# Patient Record
Sex: MALE | Race: WHITE | Employment: FULL TIME | URBAN - METROPOLITAN AREA
[De-identification: names, ages, dates, MRNs, and addresses within clinical notes are randomized per-mention and may not be internally consistent; named-entity substitution may affect disease eponyms.]

---

## 2020-03-09 ENCOUNTER — APPOINTMENT (OUTPATIENT)
Dept: GENERAL RADIOLOGY | Age: 50
End: 2020-03-09

## 2020-03-09 ENCOUNTER — HOSPITAL ENCOUNTER (EMERGENCY)
Age: 50
Discharge: HOME | End: 2020-03-09

## 2020-03-09 VITALS
DIASTOLIC BLOOD PRESSURE: 94 MMHG | HEIGHT: 72 IN | SYSTOLIC BLOOD PRESSURE: 148 MMHG | WEIGHT: 211 LBS | OXYGEN SATURATION: 96 % | TEMPERATURE: 98 F | HEART RATE: 78 BPM | RESPIRATION RATE: 16 BRPM | BODY MASS INDEX: 28.58 KG/M2

## 2020-03-09 PROCEDURE — 6360000002 HC RX W HCPCS: Performed by: NURSE PRACTITIONER

## 2020-03-09 PROCEDURE — 6370000000 HC RX 637 (ALT 250 FOR IP): Performed by: NURSE PRACTITIONER

## 2020-03-09 PROCEDURE — 96372 THER/PROPH/DIAG INJ SC/IM: CPT

## 2020-03-09 PROCEDURE — 73110 X-RAY EXAM OF WRIST: CPT

## 2020-03-09 PROCEDURE — 99283 EMERGENCY DEPT VISIT LOW MDM: CPT

## 2020-03-09 RX ORDER — HYDROCODONE BITARTRATE AND ACETAMINOPHEN 5; 325 MG/1; MG/1
1 TABLET ORAL ONCE
Status: COMPLETED | OUTPATIENT
Start: 2020-03-09 | End: 2020-03-09

## 2020-03-09 RX ORDER — HYDROCODONE BITARTRATE AND ACETAMINOPHEN 5; 325 MG/1; MG/1
1 TABLET ORAL EVERY 8 HOURS PRN
Qty: 9 TABLET | Refills: 0 | Status: SHIPPED | OUTPATIENT
Start: 2020-03-09 | End: 2020-03-12

## 2020-03-09 RX ORDER — KETOROLAC TROMETHAMINE 30 MG/ML
30 INJECTION, SOLUTION INTRAMUSCULAR; INTRAVENOUS ONCE
Status: COMPLETED | OUTPATIENT
Start: 2020-03-09 | End: 2020-03-09

## 2020-03-09 RX ORDER — IBUPROFEN 800 MG/1
800 TABLET ORAL EVERY 8 HOURS PRN
Qty: 15 TABLET | Refills: 0 | Status: SHIPPED | OUTPATIENT
Start: 2020-03-09 | End: 2020-03-14

## 2020-03-09 RX ADMIN — KETOROLAC TROMETHAMINE 30 MG: 30 INJECTION, SOLUTION INTRAMUSCULAR; INTRAVENOUS at 11:55

## 2020-03-09 RX ADMIN — HYDROCODONE BITARTRATE AND ACETAMINOPHEN 1 TABLET: 5; 325 TABLET ORAL at 11:57

## 2020-03-09 ASSESSMENT — PAIN DESCRIPTION - PAIN TYPE: TYPE: ACUTE PAIN

## 2020-03-09 ASSESSMENT — PAIN DESCRIPTION - LOCATION: LOCATION: WRIST

## 2020-03-09 ASSESSMENT — PAIN DESCRIPTION - ONSET: ONSET: SUDDEN

## 2020-03-09 ASSESSMENT — PAIN DESCRIPTION - FREQUENCY: FREQUENCY: CONTINUOUS

## 2020-03-09 ASSESSMENT — PAIN SCALES - GENERAL
PAINLEVEL_OUTOF10: 8

## 2020-03-09 ASSESSMENT — PAIN DESCRIPTION - ORIENTATION: ORIENTATION: RIGHT

## 2020-03-09 ASSESSMENT — PAIN DESCRIPTION - PROGRESSION: CLINICAL_PROGRESSION: GRADUALLY WORSENING

## 2020-03-09 ASSESSMENT — PAIN DESCRIPTION - DESCRIPTORS: DESCRIPTORS: CONSTANT;CRAMPING

## 2020-03-09 NOTE — ED PROVIDER NOTES
[facility]       Department of Emergency Medicine   ED  Provider Note  Admit Date/RoomTime: 3/9/2020 10:10 AM  ED Room: 14/14  Chief Complaint   Wrist Pain (right wrist, pain goes into the hand also. No known injury. Ibuprofen- took a total of 800mg around 0500.)    History of Present Illness   Source of history provided by:  patient. History/Exam Limitations: none. Thomas Green is a 54 y.o. old male who has a past medical history of:   Past Medical History:   Diagnosis Date    Chronic back pain     Hyperlipidemia     Hypertension     Leg pain     Numbness and tingling BUTTOCKS, THIGH, GROIN   presents to the emergency department complaint of intermittent right wrist pain the past 2 months. That last night the pain was at its worst.  reports intermittent tingling sensation to his fourth and third digits. Denies fever, chills, nausea, vomiting, or any other complaints at this time. Denies any known injury. States that he is a  with requires repetitive motion. He is right-hand dominant. States that he has been using ibuprofen at times with mild improvement. Since onset the symptoms have been moderate in degree. His pain is aggraveated by movement and use and relieved by nothing. ROS    Pertinent positives and negatives are stated within HPI, all other systems reviewed and are negative. Past Surgical History:  has no past surgical history on file. Social History:  reports that he has been smoking. He has been smoking about 1.50 packs per day. He has never used smokeless tobacco. He reports that he does not drink alcohol or use drugs. Family History: family history includes Cancer in his mother; Heart Disease in his father; High Blood Pressure in his father; High Cholesterol in his father. Allergies: Patient has no known allergies.     Physical Exam            ED Triage Vitals   BP Temp Temp Source Pulse Resp SpO2 Height Weight   03/09/20 1005 03/09/20 1005 03/09/20 1005 03/09/20 1005 03/09/20 1005 03/09/20 1005 03/09/20 1027 03/09/20 1027   (!) 148/94 98 °F (36.7 °C) Oral 78 16 96 % 6' (1.829 m) 211 lb (95.7 kg)     Oxygen Saturation Interpretation: Normal.    Constitutional:  Alert, development consistent with age. Neck:  Normal ROM. Supple. Non-tender. Wrist:  Right  radial and ulnar aspect. Tenderness:  moderate. Swelling: None. Deformity: no.            ROM: diminished range with pain. Skin:  no erythema, rash or wounds noted. Neurovascular: Motor deficit: none. Sensory deficit:   none. Pulse deficit: none. Capillary refill: normal.  Elbow: Right              Tenderness: none              Swelling: None. Deformity: no.               ROM: full range of motion. Skin:  no erythema, rash or wounds noted. Hand: Right              Tenderness: none              Swelling: None. Deformity: no.               ROM: diminished range with pain. Skin:  no erythema, rash or wounds noted.  + Tinel's test  Lymphatics: No lymphangitis or adenopathy noted. Neurological:  Oriented. Motor functions intact. Lab / Imaging Results   (All laboratory and radiology results have been personally reviewed by myself)  Labs:  No results found for this visit on 03/09/20. Imaging: All Radiology results interpreted by Radiologist unless otherwise noted. XR WRIST RIGHT (MIN 3 VIEWS)   Final Result   No significant abnormal findings. ED Course / Medical Decision Making     Medications   HYDROcodone-acetaminophen (NORCO) 5-325 MG per tablet 1 tablet (has no administration in time range)   ketorolac (TORADOL) injection 30 mg (30 mg Intramuscular Given 3/9/20 1155)        Consult:   none    Procedure(s):   none    MDM:    Films were obtained and are negative for fracture.   He had positive tendons test and on clinical exam symptoms most likely accuracy; however, inadvertent computerized transcription errors may be present.   END OF ED PROVIDER NOTE       MARIBEL Dick - CNP  03/09/20 [id number]

## 2022-02-07 ENCOUNTER — APPOINTMENT (OUTPATIENT)
Dept: CT IMAGING | Age: 52
End: 2022-02-07
Payer: COMMERCIAL

## 2022-02-07 ENCOUNTER — APPOINTMENT (OUTPATIENT)
Dept: GENERAL RADIOLOGY | Age: 52
End: 2022-02-07
Payer: COMMERCIAL

## 2022-02-07 ENCOUNTER — HOSPITAL ENCOUNTER (EMERGENCY)
Age: 52
Discharge: ANOTHER HOSPITAL | End: 2022-02-07
Attending: EMERGENCY MEDICINE
Payer: COMMERCIAL

## 2022-02-07 ENCOUNTER — HOSPITAL ENCOUNTER (INPATIENT)
Age: 52
LOS: 3 days | Discharge: HOME HEALTH | DRG: 194 | End: 2022-02-10
Attending: INTERNAL MEDICINE | Admitting: INTERNAL MEDICINE
Payer: COMMERCIAL

## 2022-02-07 VITALS
BODY MASS INDEX: 25.45 KG/M2 | SYSTOLIC BLOOD PRESSURE: 134 MMHG | RESPIRATION RATE: 16 BRPM | DIASTOLIC BLOOD PRESSURE: 76 MMHG | TEMPERATURE: 98.1 F | OXYGEN SATURATION: 92 % | HEART RATE: 86 BPM | HEIGHT: 73 IN | WEIGHT: 192 LBS

## 2022-02-07 DIAGNOSIS — R91.8 PULMONARY NODULES/LESIONS, MULTIPLE: ICD-10-CM

## 2022-02-07 DIAGNOSIS — J96.01 ACUTE RESPIRATORY FAILURE WITH HYPOXIA (HCC): Primary | ICD-10-CM

## 2022-02-07 PROBLEM — J18.9 PNEUMONIA: Status: ACTIVE | Noted: 2022-02-07

## 2022-02-07 LAB
ALBUMIN SERPL-MCNC: 3.8 G/DL (ref 3.5–5.2)
ALBUMIN SERPL-MCNC: 4.1 G/DL (ref 3.5–5.2)
ALP BLD-CCNC: 78 U/L (ref 40–129)
ALP BLD-CCNC: 81 U/L (ref 40–129)
ALT SERPL-CCNC: 11 U/L (ref 0–40)
ALT SERPL-CCNC: 9 U/L (ref 0–40)
ANION GAP SERPL CALCULATED.3IONS-SCNC: 10 MMOL/L (ref 7–16)
ANION GAP SERPL CALCULATED.3IONS-SCNC: 13 MMOL/L (ref 7–16)
AST SERPL-CCNC: 11 U/L (ref 0–39)
AST SERPL-CCNC: 15 U/L (ref 0–39)
BASOPHILS ABSOLUTE: 0.01 E9/L (ref 0–0.2)
BASOPHILS ABSOLUTE: 0.02 E9/L (ref 0–0.2)
BASOPHILS RELATIVE PERCENT: 0.1 % (ref 0–2)
BASOPHILS RELATIVE PERCENT: 0.2 % (ref 0–2)
BILIRUB SERPL-MCNC: 0.4 MG/DL (ref 0–1.2)
BILIRUB SERPL-MCNC: 0.6 MG/DL (ref 0–1.2)
BUN BLDV-MCNC: 14 MG/DL (ref 6–20)
BUN BLDV-MCNC: 17 MG/DL (ref 6–20)
CALCIUM SERPL-MCNC: 8.7 MG/DL (ref 8.6–10.2)
CALCIUM SERPL-MCNC: 9.4 MG/DL (ref 8.6–10.2)
CHLORIDE BLD-SCNC: 96 MMOL/L (ref 98–107)
CHLORIDE BLD-SCNC: 97 MMOL/L (ref 98–107)
CO2: 29 MMOL/L (ref 22–29)
CO2: 31 MMOL/L (ref 22–29)
CREAT SERPL-MCNC: 0.9 MG/DL (ref 0.7–1.2)
CREAT SERPL-MCNC: 1 MG/DL (ref 0.7–1.2)
D DIMER: 600 NG/ML DDU
EKG ATRIAL RATE: 111 BPM
EKG P AXIS: 84 DEGREES
EKG P-R INTERVAL: 140 MS
EKG Q-T INTERVAL: 318 MS
EKG QRS DURATION: 96 MS
EKG QTC CALCULATION (BAZETT): 432 MS
EKG R AXIS: -58 DEGREES
EKG T AXIS: 81 DEGREES
EKG VENTRICULAR RATE: 111 BPM
EOSINOPHILS ABSOLUTE: 0 E9/L (ref 0.05–0.5)
EOSINOPHILS ABSOLUTE: 0.01 E9/L (ref 0.05–0.5)
EOSINOPHILS RELATIVE PERCENT: 0 % (ref 0–6)
EOSINOPHILS RELATIVE PERCENT: 0.1 % (ref 0–6)
GFR AFRICAN AMERICAN: >60
GFR AFRICAN AMERICAN: >60
GFR NON-AFRICAN AMERICAN: >60 ML/MIN/1.73
GFR NON-AFRICAN AMERICAN: >60 ML/MIN/1.73
GLUCOSE BLD-MCNC: 132 MG/DL (ref 74–99)
GLUCOSE BLD-MCNC: 268 MG/DL (ref 74–99)
HCT VFR BLD CALC: 43.5 % (ref 37–54)
HCT VFR BLD CALC: 43.7 % (ref 37–54)
HEMOGLOBIN: 14.1 G/DL (ref 12.5–16.5)
HEMOGLOBIN: 15 G/DL (ref 12.5–16.5)
IMMATURE GRANULOCYTES #: 0.04 E9/L
IMMATURE GRANULOCYTES #: 0.04 E9/L
IMMATURE GRANULOCYTES %: 0.4 % (ref 0–5)
IMMATURE GRANULOCYTES %: 0.4 % (ref 0–5)
INFLUENZA A BY PCR: NOT DETECTED
INFLUENZA B BY PCR: NOT DETECTED
LACTIC ACID: 1 MMOL/L (ref 0.5–2.2)
LYMPHOCYTES ABSOLUTE: 0.66 E9/L (ref 1.5–4)
LYMPHOCYTES ABSOLUTE: 1.15 E9/L (ref 1.5–4)
LYMPHOCYTES RELATIVE PERCENT: 10.1 % (ref 20–42)
LYMPHOCYTES RELATIVE PERCENT: 7.3 % (ref 20–42)
MCH RBC QN AUTO: 30.3 PG (ref 26–35)
MCH RBC QN AUTO: 31.4 PG (ref 26–35)
MCHC RBC AUTO-ENTMCNC: 32.4 % (ref 32–34.5)
MCHC RBC AUTO-ENTMCNC: 34.3 % (ref 32–34.5)
MCV RBC AUTO: 91.6 FL (ref 80–99.9)
MCV RBC AUTO: 93.5 FL (ref 80–99.9)
MONOCYTES ABSOLUTE: 0.25 E9/L (ref 0.1–0.95)
MONOCYTES ABSOLUTE: 0.93 E9/L (ref 0.1–0.95)
MONOCYTES RELATIVE PERCENT: 2.8 % (ref 2–12)
MONOCYTES RELATIVE PERCENT: 8.2 % (ref 2–12)
NEUTROPHILS ABSOLUTE: 8.11 E9/L (ref 1.8–7.3)
NEUTROPHILS ABSOLUTE: 9.21 E9/L (ref 1.8–7.3)
NEUTROPHILS RELATIVE PERCENT: 81 % (ref 43–80)
NEUTROPHILS RELATIVE PERCENT: 89.4 % (ref 43–80)
PDW BLD-RTO: 12.9 FL (ref 11.5–15)
PDW BLD-RTO: 12.9 FL (ref 11.5–15)
PLATELET # BLD: 227 E9/L (ref 130–450)
PLATELET # BLD: 228 E9/L (ref 130–450)
PMV BLD AUTO: 8.6 FL (ref 7–12)
PMV BLD AUTO: 8.9 FL (ref 7–12)
POC BASE EXCESS: 3.5 MMOL/L (ref -3–3)
POC CPB: NO
POC DELIVERY SYSTEM: ABNORMAL
POC DEVICE ID: ABNORMAL
POC FIO2: 3
POC HCO3: 30.5 MMOL/L (ref 22–26)
POC O2 SATURATION: 90.4 % (ref 92–98.5)
POC OPERATOR ID: ABNORMAL
POC PCO2: 51.2 MMHG (ref 35–45)
POC PH: 7.38 (ref 7.35–7.45)
POC PO2: 61.7 MMHG (ref 80–100)
POC SAMPLE TYPE: ABNORMAL
POTASSIUM REFLEX MAGNESIUM: 4.1 MMOL/L (ref 3.5–5)
POTASSIUM SERPL-SCNC: 4 MMOL/L (ref 3.5–5)
PRO-BNP: 195 PG/ML (ref 0–125)
RBC # BLD: 4.65 E12/L (ref 3.8–5.8)
RBC # BLD: 4.77 E12/L (ref 3.8–5.8)
SARS-COV-2, NAAT: NOT DETECTED
SEDIMENTATION RATE, ERYTHROCYTE: 60 MM/HR (ref 0–15)
SODIUM BLD-SCNC: 137 MMOL/L (ref 132–146)
SODIUM BLD-SCNC: 139 MMOL/L (ref 132–146)
TOTAL PROTEIN: 6.4 G/DL (ref 6.4–8.3)
TOTAL PROTEIN: 7.7 G/DL (ref 6.4–8.3)
TROPONIN, HIGH SENSITIVITY: 6 NG/L (ref 0–11)
WBC # BLD: 11.4 E9/L (ref 4.5–11.5)
WBC # BLD: 9.1 E9/L (ref 4.5–11.5)

## 2022-02-07 PROCEDURE — 6360000002 HC RX W HCPCS: Performed by: EMERGENCY MEDICINE

## 2022-02-07 PROCEDURE — 83880 ASSAY OF NATRIURETIC PEPTIDE: CPT

## 2022-02-07 PROCEDURE — 83516 IMMUNOASSAY NONANTIBODY: CPT

## 2022-02-07 PROCEDURE — 86038 ANTINUCLEAR ANTIBODIES: CPT

## 2022-02-07 PROCEDURE — 80053 COMPREHEN METABOLIC PANEL: CPT

## 2022-02-07 PROCEDURE — 87502 INFLUENZA DNA AMP PROBE: CPT

## 2022-02-07 PROCEDURE — 99223 1ST HOSP IP/OBS HIGH 75: CPT | Performed by: INTERNAL MEDICINE

## 2022-02-07 PROCEDURE — 96375 TX/PRO/DX INJ NEW DRUG ADDON: CPT

## 2022-02-07 PROCEDURE — 36415 COLL VENOUS BLD VENIPUNCTURE: CPT

## 2022-02-07 PROCEDURE — 71045 X-RAY EXAM CHEST 1 VIEW: CPT

## 2022-02-07 PROCEDURE — 2580000003 HC RX 258: Performed by: EMERGENCY MEDICINE

## 2022-02-07 PROCEDURE — 71275 CT ANGIOGRAPHY CHEST: CPT

## 2022-02-07 PROCEDURE — 85378 FIBRIN DEGRADE SEMIQUANT: CPT

## 2022-02-07 PROCEDURE — 85651 RBC SED RATE NONAUTOMATED: CPT

## 2022-02-07 PROCEDURE — 84145 PROCALCITONIN (PCT): CPT

## 2022-02-07 PROCEDURE — 6370000000 HC RX 637 (ALT 250 FOR IP): Performed by: EMERGENCY MEDICINE

## 2022-02-07 PROCEDURE — 6360000004 HC RX CONTRAST MEDICATION: Performed by: RADIOLOGY

## 2022-02-07 PROCEDURE — 83605 ASSAY OF LACTIC ACID: CPT

## 2022-02-07 PROCEDURE — 86160 COMPLEMENT ANTIGEN: CPT

## 2022-02-07 PROCEDURE — 93005 ELECTROCARDIOGRAM TRACING: CPT | Performed by: EMERGENCY MEDICINE

## 2022-02-07 PROCEDURE — 85025 COMPLETE CBC W/AUTO DIFF WBC: CPT

## 2022-02-07 PROCEDURE — 2060000000 HC ICU INTERMEDIATE R&B

## 2022-02-07 PROCEDURE — 87635 SARS-COV-2 COVID-19 AMP PRB: CPT

## 2022-02-07 PROCEDURE — 0202U NFCT DS 22 TRGT SARS-COV-2: CPT

## 2022-02-07 PROCEDURE — 82803 BLOOD GASES ANY COMBINATION: CPT

## 2022-02-07 PROCEDURE — 99285 EMERGENCY DEPT VISIT HI MDM: CPT

## 2022-02-07 PROCEDURE — 86140 C-REACTIVE PROTEIN: CPT

## 2022-02-07 PROCEDURE — 87040 BLOOD CULTURE FOR BACTERIA: CPT

## 2022-02-07 PROCEDURE — 2580000003 HC RX 258: Performed by: NURSE PRACTITIONER

## 2022-02-07 PROCEDURE — 84484 ASSAY OF TROPONIN QUANT: CPT

## 2022-02-07 PROCEDURE — 96365 THER/PROPH/DIAG IV INF INIT: CPT

## 2022-02-07 PROCEDURE — 6360000002 HC RX W HCPCS: Performed by: NURSE PRACTITIONER

## 2022-02-07 RX ORDER — IPRATROPIUM BROMIDE AND ALBUTEROL SULFATE 2.5; .5 MG/3ML; MG/3ML
1 SOLUTION RESPIRATORY (INHALATION)
Status: DISCONTINUED | OUTPATIENT
Start: 2022-02-08 | End: 2022-02-09

## 2022-02-07 RX ORDER — IPRATROPIUM BROMIDE AND ALBUTEROL SULFATE 2.5; .5 MG/3ML; MG/3ML
1 SOLUTION RESPIRATORY (INHALATION) ONCE
Status: COMPLETED | OUTPATIENT
Start: 2022-02-07 | End: 2022-02-07

## 2022-02-07 RX ORDER — ACETAMINOPHEN 650 MG/1
650 SUPPOSITORY RECTAL EVERY 6 HOURS PRN
Status: DISCONTINUED | OUTPATIENT
Start: 2022-02-07 | End: 2022-02-10 | Stop reason: HOSPADM

## 2022-02-07 RX ORDER — ONDANSETRON 4 MG/1
4 TABLET, ORALLY DISINTEGRATING ORAL EVERY 8 HOURS PRN
Status: DISCONTINUED | OUTPATIENT
Start: 2022-02-07 | End: 2022-02-10 | Stop reason: HOSPADM

## 2022-02-07 RX ORDER — SODIUM CHLORIDE 0.9 % (FLUSH) 0.9 %
5-40 SYRINGE (ML) INJECTION PRN
Status: DISCONTINUED | OUTPATIENT
Start: 2022-02-07 | End: 2022-02-10 | Stop reason: HOSPADM

## 2022-02-07 RX ORDER — SODIUM CHLORIDE 0.9 % (FLUSH) 0.9 %
5-40 SYRINGE (ML) INJECTION EVERY 12 HOURS SCHEDULED
Status: DISCONTINUED | OUTPATIENT
Start: 2022-02-07 | End: 2022-02-10 | Stop reason: HOSPADM

## 2022-02-07 RX ORDER — METHYLPREDNISOLONE SODIUM SUCCINATE 125 MG/2ML
125 INJECTION, POWDER, LYOPHILIZED, FOR SOLUTION INTRAMUSCULAR; INTRAVENOUS ONCE
Status: COMPLETED | OUTPATIENT
Start: 2022-02-07 | End: 2022-02-07

## 2022-02-07 RX ORDER — SODIUM CHLORIDE 9 MG/ML
25 INJECTION, SOLUTION INTRAVENOUS PRN
Status: DISCONTINUED | OUTPATIENT
Start: 2022-02-07 | End: 2022-02-10 | Stop reason: HOSPADM

## 2022-02-07 RX ORDER — 0.9 % SODIUM CHLORIDE 0.9 %
1000 INTRAVENOUS SOLUTION INTRAVENOUS ONCE
Status: COMPLETED | OUTPATIENT
Start: 2022-02-07 | End: 2022-02-07

## 2022-02-07 RX ORDER — PREDNISONE 20 MG/1
40 TABLET ORAL DAILY
Status: DISCONTINUED | OUTPATIENT
Start: 2022-02-10 | End: 2022-02-10 | Stop reason: HOSPADM

## 2022-02-07 RX ORDER — LOSARTAN POTASSIUM 25 MG/1
25 TABLET ORAL DAILY
Status: DISCONTINUED | OUTPATIENT
Start: 2022-02-08 | End: 2022-02-10 | Stop reason: HOSPADM

## 2022-02-07 RX ORDER — POLYETHYLENE GLYCOL 3350 17 G/17G
17 POWDER, FOR SOLUTION ORAL DAILY PRN
Status: DISCONTINUED | OUTPATIENT
Start: 2022-02-07 | End: 2022-02-10 | Stop reason: HOSPADM

## 2022-02-07 RX ORDER — METHYLPREDNISOLONE SODIUM SUCCINATE 40 MG/ML
40 INJECTION, POWDER, LYOPHILIZED, FOR SOLUTION INTRAMUSCULAR; INTRAVENOUS EVERY 6 HOURS
Status: COMPLETED | OUTPATIENT
Start: 2022-02-07 | End: 2022-02-09

## 2022-02-07 RX ORDER — ONDANSETRON 2 MG/ML
4 INJECTION INTRAMUSCULAR; INTRAVENOUS EVERY 6 HOURS PRN
Status: DISCONTINUED | OUTPATIENT
Start: 2022-02-07 | End: 2022-02-10 | Stop reason: HOSPADM

## 2022-02-07 RX ORDER — LOSARTAN POTASSIUM 25 MG/1
25 TABLET ORAL DAILY
COMMUNITY

## 2022-02-07 RX ORDER — ACETAMINOPHEN 325 MG/1
650 TABLET ORAL EVERY 6 HOURS PRN
Status: DISCONTINUED | OUTPATIENT
Start: 2022-02-07 | End: 2022-02-10 | Stop reason: HOSPADM

## 2022-02-07 RX ADMIN — METHYLPREDNISOLONE SODIUM SUCCINATE 40 MG: 40 INJECTION, POWDER, LYOPHILIZED, FOR SOLUTION INTRAMUSCULAR; INTRAVENOUS at 22:44

## 2022-02-07 RX ADMIN — CEFEPIME HYDROCHLORIDE 2000 MG: 2 INJECTION, POWDER, FOR SOLUTION INTRAVENOUS at 17:59

## 2022-02-07 RX ADMIN — IOPAMIDOL 75 ML: 755 INJECTION, SOLUTION INTRAVENOUS at 16:26

## 2022-02-07 RX ADMIN — METHYLPREDNISOLONE SODIUM SUCCINATE 125 MG: 125 INJECTION, POWDER, FOR SOLUTION INTRAMUSCULAR; INTRAVENOUS at 12:01

## 2022-02-07 RX ADMIN — IPRATROPIUM BROMIDE AND ALBUTEROL SULFATE 1 AMPULE: .5; 2.5 SOLUTION RESPIRATORY (INHALATION) at 12:18

## 2022-02-07 RX ADMIN — SODIUM CHLORIDE 1000 ML: 9 INJECTION, SOLUTION INTRAVENOUS at 12:02

## 2022-02-07 RX ADMIN — SODIUM CHLORIDE, PRESERVATIVE FREE 10 ML: 5 INJECTION INTRAVENOUS at 22:44

## 2022-02-07 ASSESSMENT — ENCOUNTER SYMPTOMS
GASTROINTESTINAL NEGATIVE: 1
SHORTNESS OF BREATH: 1
COUGH: 1
EYES NEGATIVE: 1
ALLERGIC/IMMUNOLOGIC NEGATIVE: 1

## 2022-02-07 ASSESSMENT — PAIN SCALES - GENERAL: PAINLEVEL_OUTOF10: 0

## 2022-02-07 NOTE — ED PROVIDER NOTES
Patient was signed out to me by Dr. Deion Galvan. Please see Dr. Webster Mems note for complete history and physical. Briefly, patient presents with shortness of breath, fevers, cough, and new hypoxia. Signed out pending CTA chest and admission. CTA PULMONARY W CONTRAST   Final Result   1. No evidence of pulmonary embolism. 2. There are multiple cavitary and non cavitary nodules within the bilateral   lungs as well as consolidation in the right upper lobe adjacent to the major   fissure. This could be secondary to infection. The differential diagnosis   includes autoimmune disease such as granulomatosis with polyangiitis and   rheumatoid arthritis. These could represent septic emboli. Primary   malignancy and cavitary metastases from such entities as squamous cell   carcinoma should also be considered. 3. Emphysema. XR CHEST PORTABLE   Final Result   No active cardiopulmonary disease. Patient covered with broad-spectrum antibiotics, cefepime and vancomycin. Blood cultures have been sent. Patient is requesting admission to [facility]. Calls been placed for admission. Dr. Nayan López accepted the patient for admission.      Marcelino Lorenzo MD  02/07/22 [id number]

## 2022-02-07 NOTE — ED PROVIDER NOTES
HPI:  2/7/22, Time: 11:43 AM BASILIA Curran is a 54 y.o. male presenting to the ED for productive cough, shortness of breath, fatigue, body aches, anorexia, fever and chills, beginning 5 days ago, Thursday. Home covid test was negative. They state he has the beginning of stages of COPD, still smokes. The complaint has been persistent, moderate in severity, and worsened by nothing. Patient denies sore throat, chest pain, edema, headache, visual disturbances, focal paresthesias, focal weakness, abdominal pain, nausea, vomiting, diarrhea, constipation, dysuria, hematuria, trauma, neck or back pain, rash or other complaints. ROS:   A complete review of systems was performed and all pertinent positives and negatives are stated within HPI, all other systems reviewed and are negative.      --------------------------------------------- PAST HISTORY ---------------------------------------------  Past Medical History:  has a past medical history of Chronic back pain, Hyperlipidemia, Hypertension, Leg pain, and Numbness and tingling. Past Surgical History:  has no past surgical history on file. Social History:  reports that he has been smoking. He has been smoking about 1.50 packs per day. He has never used smokeless tobacco. He reports that he does not drink alcohol and does not use drugs. Family History: family history includes Cancer in his mother; Heart Disease in his father; High Blood Pressure in his father; High Cholesterol in his father. The patients home medications have been reviewed. Allergies: Pcn [penicillins]        ----------------------------------------PHYSICAL EXAM--------------------------------------  Constitutional:  Well developed, well nourished, no acute distress, non-toxic appearance   Eyes:  PERRL, conjunctiva normal, EOMI  HENT:  Atraumatic, external ears normal, nose normal, oropharynx moist, no pharyngeal exudates, redness or swelling.  Neck- normal range of motion, no nuchal rigidity   Respiratory:  Mild respiratory distress, diffusely diminished breath sounds, no rales, no rhonchi, scattered expiratory wheezing, junky cough  Cardiovascular:  Tachycardic rate, normal rhythm, no murmurs, no gallops, no rubs. Radial and DP pulses 2+ bilaterally. Compartments are soft. He is warm and well perfused. Cap refill less than 3 seconds. GI:  Soft, nondistended, normal bowel sounds, nontender, no organomegaly, no mass, no rebound, no guarding   :  No costovertebral angle tenderness   Musculoskeletal:  No edema, no tenderness, no deformities. Back- no tenderness  Integument:  Well hydrated, no visible rash. Adequate perfusion. Lymphatic:  No cervical lymphadenopathy noted   Neurologic:  Alert & oriented x 3, CN 2-12 normal, no focal deficits noted. Normal gait. Psychiatric:  Speech and behavior appropriate       -------------------------------------------------- RESULTS -------------------------------------------------  I have personally reviewed all laboratory and imaging results for this patient. Results are listed below.      LABS:  Results for orders placed or performed during the hospital encounter of 02/07/22   Rapid influenza A/B antigens    Specimen: Nasopharyngeal   Result Value Ref Range    Influenza A by PCR Not Detected Not Detected    Influenza B by PCR Not Detected Not Detected   COVID-19, Rapid    Specimen: Nasopharyngeal Swab   Result Value Ref Range    SARS-CoV-2, NAAT Not Detected Not Detected   Troponin   Result Value Ref Range    Troponin, High Sensitivity 6 0 - 11 ng/L   Brain Natriuretic Peptide   Result Value Ref Range    Pro- (H) 0 - 125 pg/mL   Lactic Acid, Plasma   Result Value Ref Range    Lactic Acid 1.0 0.5 - 2.2 mmol/L   CBC auto differential   Result Value Ref Range    WBC 11.4 4.5 - 11.5 E9/L    RBC 4.77 3.80 - 5.80 E12/L    Hemoglobin 15.0 12.5 - 16.5 g/dL    Hematocrit 43.7 37.0 - 54.0 %    MCV 91.6 80.0 - 99.9 fL    MCH 31.4 26.0 - 35.0 pg    MCHC 34.3 32.0 - 34.5 %    RDW 12.9 11.5 - 15.0 fL    Platelets 152 872 - 082 E9/L    MPV 8.9 7.0 - 12.0 fL    Neutrophils % 81.0 (H) 43.0 - 80.0 %    Immature Granulocytes % 0.4 0.0 - 5.0 %    Lymphocytes % 10.1 (L) 20.0 - 42.0 %    Monocytes % 8.2 2.0 - 12.0 %    Eosinophils % 0.1 0.0 - 6.0 %    Basophils % 0.2 0.0 - 2.0 %    Neutrophils Absolute 9.21 (H) 1.80 - 7.30 E9/L    Immature Granulocytes # 0.04 E9/L    Lymphocytes Absolute 1.15 (L) 1.50 - 4.00 E9/L    Monocytes Absolute 0.93 0.10 - 0.95 E9/L    Eosinophils Absolute 0.01 (L) 0.05 - 0.50 E9/L    Basophils Absolute 0.02 0.00 - 0.20 E9/L   Comprehensive Metabolic Panel   Result Value Ref Range    Sodium 139 132 - 146 mmol/L    Potassium 4.0 3.5 - 5.0 mmol/L    Chloride 97 (L) 98 - 107 mmol/L    CO2 29 22 - 29 mmol/L    Anion Gap 13 7 - 16 mmol/L    Glucose 132 (H) 74 - 99 mg/dL    BUN 14 6 - 20 mg/dL    CREATININE 1.0 0.7 - 1.2 mg/dL    GFR Non-African American >60 >=60 mL/min/1.73    GFR African American >60     Calcium 9.4 8.6 - 10.2 mg/dL    Total Protein 7.7 6.4 - 8.3 g/dL    Albumin 4.1 3.5 - 5.2 g/dL    Total Bilirubin 0.6 0.0 - 1.2 mg/dL    Alkaline Phosphatase 81 40 - 129 U/L    ALT 11 0 - 40 U/L    AST 15 0 - 39 U/L   D-Dimer, Quantitative   Result Value Ref Range    D-Dimer, Quant 600 ng/mL DDU   POCT blood gases   Result Value Ref Range    Sample Type Arterial     FIO2 3.0     POC pH 7.382 7.350 - 7.450    POC pCO2 51.2 (H) 35.0 - 45.0 mmHg    POC PO2 61.7 (L) 80.0 - 100.0 mmHg    POC HCO3 30.5 (H) 22.0 - 26.0 mmol/L    POC Base Excess 3.5 (H) -3.0 - 3.0 mmol/L    POC O2 SAT 90.4 (L) 92.0 - 98.5 %    POC CPB No     POC  ID 41,623     POC Device ID [id number]     POC Delivery System Cannula    EKG 12 Lead   Result Value Ref Range    Ventricular Rate 111 BPM    Atrial Rate 111 BPM    P-R Interval 140 ms    QRS Duration 96 ms    Q-T Interval 318 ms    QTc Calculation (Bazett) 432 ms    P Axis 84 degrees    R Axis -58 degrees    T Axis 81 degrees       RADIOLOGY:  Interpreted by Radiologist.  CTA PULMONARY W CONTRAST   Final Result   1. No evidence of pulmonary embolism. 2. There are multiple cavitary and non cavitary nodules within the bilateral   lungs as well as consolidation in the right upper lobe adjacent to the major   fissure. This could be secondary to infection. The differential diagnosis   includes autoimmune disease such as granulomatosis with polyangiitis and   rheumatoid arthritis. These could represent septic emboli. Primary   malignancy and cavitary metastases from such entities as squamous cell   carcinoma should also be considered. 3. Emphysema. XR CHEST PORTABLE   Final Result   No active cardiopulmonary disease. EKG Interpretation  Time: 11:14 PM EST  Rhythm: sinus tachycardia  Rate: 111  Axis: left  Conduction: normal  ST Segments: nonspecific changes  T Waves: no acute change  Clinical Impression: non-specific EKG and sinus tachycardia  Comparison to prior EKG: no previous EKG      ------------------------- NURSING NOTES AND VITALS REVIEWED ---------------------------  The nursing notes within the ED encounter and vital signs as below have been reviewed by myself. /76   Pulse 86   Temp 98.1 °F (36.7 °C)   Resp 16   Ht 6' 1\" (1.854 m)   Wt 192 lb (87.1 kg)   SpO2 92%   BMI 25.33 kg/m²   Oxygen Saturation Interpretation: Abnormal and Improved after treatment      The patients available past medical records and past encounters were reviewed.         ------------------------------ ED COURSE/MEDICAL DECISION MAKING----------------------  Medications   ipratropium-albuterol (DUONEB) nebulizer solution 1 ampule (1 ampule Inhalation Given 2/7/22 1218)   methylPREDNISolone sodium (SOLU-MEDROL) injection 125 mg (125 mg IntraVENous Given 2/7/22 1201)   0.9 % sodium chloride bolus (0 mLs IntraVENous Stopped 2/7/22 1323)   iopamidol (ISOVUE-370) 76 % injection 75 mL (75 mLs IntraVENous Given 2/7/22 1626)   cefepime (MAXIPIME) 2000 mg IVPB minibag (0 mg IntraVENous Stopped 2/7/22 1839)           Procedures:   none      Medical Decision Making:    Patient care turned over to Dr Demetrius Morrell pending CT results, further treatment, diagnosed and disposition. They would like admitted to [facility]. Patient given IV solumedrol, duoneb and IV saline bolus with improvement. Requires nasal cannula. To be admitted and treated pending CT results. covid and flu neg. This patient's ED course included: re-evaluation prior to disposition, IV medications, cardiac monitoring, continuous pulse oximetry and a personal history and physicial eaxmination    This patient has remained hemodynamically stable, improved and been closely monitored during their ED course. Re-Evaluations:  Time: 1500   Re-evaluation. Patients symptoms are improving  Repeat physical examination is improved      Consultations:   none    Critical Care: none   --------------------------------- IMPRESSION AND DISPOSITION ---------------------------------    IMPRESSION  1.  Acute respiratory failure with hypoxia ([address]    2. Pulmonary nodules/lesions, multiple        DISPOSITION  Disposition: Transfer to [facility]   Patient condition is stable             Tran Cortez DO  02/08/22 [id number]

## 2022-02-07 NOTE — ED NOTES
Wife just brought patient in dinner. Room number given and estimated  by PAS to be 2030. Report called to floor and I spoke with Preet Blandon.      Jacquelyn Hernandez RN  02/07/22 1976

## 2022-02-08 PROBLEM — J96.01 ACUTE RESPIRATORY FAILURE WITH HYPOXIA (HCC): Status: ACTIVE | Noted: 2022-02-08

## 2022-02-08 PROBLEM — J44.1 COPD WITH ACUTE EXACERBATION (HCC): Status: ACTIVE | Noted: 2022-02-08

## 2022-02-08 PROBLEM — F17.200 SMOKER: Status: ACTIVE | Noted: 2022-02-08

## 2022-02-08 PROBLEM — J98.4 CAVITARY LESION OF LUNG: Status: ACTIVE | Noted: 2022-02-08

## 2022-02-08 LAB
ADENOVIRUS BY PCR: NOT DETECTED
ALBUMIN SERPL-MCNC: 3.7 G/DL (ref 3.5–5.2)
ALP BLD-CCNC: 84 U/L (ref 40–129)
ALT SERPL-CCNC: 8 U/L (ref 0–40)
ANION GAP SERPL CALCULATED.3IONS-SCNC: 9 MMOL/L (ref 7–16)
ANTI-NUCLEAR ANTIBODY (ANA): NEGATIVE
ANTISTREPTOLYSIN-O: 37 IU/ML (ref 0–200)
AST SERPL-CCNC: 11 U/L (ref 0–39)
BASOPHILS ABSOLUTE: 0.01 E9/L (ref 0–0.2)
BASOPHILS RELATIVE PERCENT: 0.1 % (ref 0–2)
BILIRUB SERPL-MCNC: 0.2 MG/DL (ref 0–1.2)
BORDETELLA PARAPERTUSSIS BY PCR: NOT DETECTED
BORDETELLA PERTUSSIS BY PCR: NOT DETECTED
BUN BLDV-MCNC: 18 MG/DL (ref 6–20)
C-REACTIVE PROTEIN: 16 MG/DL (ref 0–0.4)
C3 COMPLEMENT: 177 MG/DL (ref 90–180)
C4 COMPLEMENT: 39 MG/DL (ref 10–40)
CALCIUM SERPL-MCNC: 9.2 MG/DL (ref 8.6–10.2)
CHLAMYDOPHILIA PNEUMONIAE BY PCR: NOT DETECTED
CHLORIDE BLD-SCNC: 101 MMOL/L (ref 98–107)
CO2: 30 MMOL/L (ref 22–29)
CORONAVIRUS 229E BY PCR: NOT DETECTED
CORONAVIRUS HKU1 BY PCR: NOT DETECTED
CORONAVIRUS NL63 BY PCR: NOT DETECTED
CORONAVIRUS OC43 BY PCR: DETECTED
CREAT SERPL-MCNC: 0.9 MG/DL (ref 0.7–1.2)
EOSINOPHILS ABSOLUTE: 0 E9/L (ref 0.05–0.5)
EOSINOPHILS RELATIVE PERCENT: 0 % (ref 0–6)
GFR AFRICAN AMERICAN: >60
GFR NON-AFRICAN AMERICAN: >60 ML/MIN/1.73
GLUCOSE BLD-MCNC: 179 MG/DL (ref 74–99)
HCT VFR BLD CALC: 43.3 % (ref 37–54)
HEMOGLOBIN: 14.2 G/DL (ref 12.5–16.5)
HUMAN METAPNEUMOVIRUS BY PCR: NOT DETECTED
HUMAN RHINOVIRUS/ENTEROVIRUS BY PCR: NOT DETECTED
IMMATURE GRANULOCYTES #: 0.05 E9/L
IMMATURE GRANULOCYTES %: 0.5 % (ref 0–5)
INFLUENZA A BY PCR: NOT DETECTED
INFLUENZA B BY PCR: NOT DETECTED
L. PNEUMOPHILA SEROGP 1 UR AG: NORMAL
LYMPHOCYTES ABSOLUTE: 0.75 E9/L (ref 1.5–4)
LYMPHOCYTES RELATIVE PERCENT: 8 % (ref 20–42)
MCH RBC QN AUTO: 31 PG (ref 26–35)
MCHC RBC AUTO-ENTMCNC: 32.8 % (ref 32–34.5)
MCV RBC AUTO: 94.5 FL (ref 80–99.9)
MONOCYTES ABSOLUTE: 0.34 E9/L (ref 0.1–0.95)
MONOCYTES RELATIVE PERCENT: 3.6 % (ref 2–12)
MYCOPLASMA PNEUMONIAE BY PCR: NOT DETECTED
NEUTROPHILS ABSOLUTE: 8.2 E9/L (ref 1.8–7.3)
NEUTROPHILS RELATIVE PERCENT: 87.8 % (ref 43–80)
PARAINFLUENZA VIRUS 1 BY PCR: NOT DETECTED
PARAINFLUENZA VIRUS 2 BY PCR: NOT DETECTED
PARAINFLUENZA VIRUS 3 BY PCR: NOT DETECTED
PARAINFLUENZA VIRUS 4 BY PCR: NOT DETECTED
PDW BLD-RTO: 12.8 FL (ref 11.5–15)
PLATELET # BLD: 240 E9/L (ref 130–450)
PMV BLD AUTO: 8.6 FL (ref 7–12)
POTASSIUM REFLEX MAGNESIUM: 4.2 MMOL/L (ref 3.5–5)
PROCALCITONIN: 0.06 NG/ML (ref 0–0.08)
RBC # BLD: 4.58 E12/L (ref 3.8–5.8)
RESPIRATORY SYNCYTIAL VIRUS BY PCR: NOT DETECTED
SARS-COV-2, PCR: NOT DETECTED
SODIUM BLD-SCNC: 140 MMOL/L (ref 132–146)
STREP PNEUMONIAE ANTIGEN, URINE: NORMAL
TOTAL PROTEIN: 7.2 G/DL (ref 6.4–8.3)
WBC # BLD: 9.4 E9/L (ref 4.5–11.5)

## 2022-02-08 PROCEDURE — 36415 COLL VENOUS BLD VENIPUNCTURE: CPT

## 2022-02-08 PROCEDURE — 85025 COMPLETE CBC W/AUTO DIFF WBC: CPT

## 2022-02-08 PROCEDURE — 6360000002 HC RX W HCPCS: Performed by: SPECIALIST

## 2022-02-08 PROCEDURE — 94640 AIRWAY INHALATION TREATMENT: CPT

## 2022-02-08 PROCEDURE — 6370000000 HC RX 637 (ALT 250 FOR IP): Performed by: NURSE PRACTITIONER

## 2022-02-08 PROCEDURE — 2580000003 HC RX 258: Performed by: SPECIALIST

## 2022-02-08 PROCEDURE — 80053 COMPREHEN METABOLIC PANEL: CPT

## 2022-02-08 PROCEDURE — 2580000003 HC RX 258: Performed by: NURSE PRACTITIONER

## 2022-02-08 PROCEDURE — 94664 DEMO&/EVAL PT USE INHALER: CPT

## 2022-02-08 PROCEDURE — 2580000003 HC RX 258: Performed by: REGISTERED NURSE

## 2022-02-08 PROCEDURE — 86060 ANTISTREPTOLYSIN O TITER: CPT

## 2022-02-08 PROCEDURE — 99232 SBSQ HOSP IP/OBS MODERATE 35: CPT | Performed by: FAMILY MEDICINE

## 2022-02-08 PROCEDURE — 84145 PROCALCITONIN (PCT): CPT

## 2022-02-08 PROCEDURE — 2060000000 HC ICU INTERMEDIATE R&B

## 2022-02-08 PROCEDURE — 6360000002 HC RX W HCPCS: Performed by: NURSE PRACTITIONER

## 2022-02-08 PROCEDURE — 87449 NOS EACH ORGANISM AG IA: CPT

## 2022-02-08 RX ORDER — SODIUM CHLORIDE 9 MG/ML
25 INJECTION, SOLUTION INTRAVENOUS PRN
Status: DISCONTINUED | OUTPATIENT
Start: 2022-02-08 | End: 2022-02-10 | Stop reason: HOSPADM

## 2022-02-08 RX ORDER — LIDOCAINE HYDROCHLORIDE 10 MG/ML
5 INJECTION, SOLUTION EPIDURAL; INFILTRATION; INTRACAUDAL; PERINEURAL ONCE
Status: COMPLETED | OUTPATIENT
Start: 2022-02-08 | End: 2022-02-10

## 2022-02-08 RX ORDER — SODIUM CHLORIDE 0.9 % (FLUSH) 0.9 %
5-40 SYRINGE (ML) INJECTION EVERY 12 HOURS SCHEDULED
Status: DISCONTINUED | OUTPATIENT
Start: 2022-02-08 | End: 2022-02-10 | Stop reason: HOSPADM

## 2022-02-08 RX ORDER — HEPARIN SODIUM (PORCINE) LOCK FLUSH IV SOLN 100 UNIT/ML 100 UNIT/ML
3 SOLUTION INTRAVENOUS PRN
Status: DISCONTINUED | OUTPATIENT
Start: 2022-02-08 | End: 2022-02-10 | Stop reason: HOSPADM

## 2022-02-08 RX ORDER — SODIUM CHLORIDE 0.9 % (FLUSH) 0.9 %
5-40 SYRINGE (ML) INJECTION PRN
Status: DISCONTINUED | OUTPATIENT
Start: 2022-02-08 | End: 2022-02-10 | Stop reason: HOSPADM

## 2022-02-08 RX ORDER — HEPARIN SODIUM (PORCINE) LOCK FLUSH IV SOLN 100 UNIT/ML 100 UNIT/ML
3 SOLUTION INTRAVENOUS EVERY 12 HOURS SCHEDULED
Status: DISCONTINUED | OUTPATIENT
Start: 2022-02-08 | End: 2022-02-10 | Stop reason: HOSPADM

## 2022-02-08 RX ADMIN — AMPICILLIN SODIUM AND SULBACTAM SODIUM 3000 MG: 2; 1 INJECTION, POWDER, FOR SOLUTION INTRAMUSCULAR; INTRAVENOUS at 17:16

## 2022-02-08 RX ADMIN — METHYLPREDNISOLONE SODIUM SUCCINATE 40 MG: 40 INJECTION, POWDER, LYOPHILIZED, FOR SOLUTION INTRAMUSCULAR; INTRAVENOUS at 04:24

## 2022-02-08 RX ADMIN — SODIUM CHLORIDE, PRESERVATIVE FREE 10 ML: 5 INJECTION INTRAVENOUS at 21:28

## 2022-02-08 RX ADMIN — SODIUM CHLORIDE, PRESERVATIVE FREE 10 ML: 5 INJECTION INTRAVENOUS at 10:28

## 2022-02-08 RX ADMIN — LOSARTAN POTASSIUM 25 MG: 25 TABLET, FILM COATED ORAL at 10:22

## 2022-02-08 RX ADMIN — IPRATROPIUM BROMIDE AND ALBUTEROL SULFATE 1 AMPULE: 2.5; .5 SOLUTION RESPIRATORY (INHALATION) at 08:50

## 2022-02-08 RX ADMIN — METHYLPREDNISOLONE SODIUM SUCCINATE 40 MG: 40 INJECTION, POWDER, LYOPHILIZED, FOR SOLUTION INTRAMUSCULAR; INTRAVENOUS at 21:27

## 2022-02-08 RX ADMIN — AMPICILLIN SODIUM AND SULBACTAM SODIUM 3000 MG: 2; 1 INJECTION, POWDER, FOR SOLUTION INTRAMUSCULAR; INTRAVENOUS at 21:27

## 2022-02-08 RX ADMIN — METHYLPREDNISOLONE SODIUM SUCCINATE 40 MG: 40 INJECTION, POWDER, LYOPHILIZED, FOR SOLUTION INTRAMUSCULAR; INTRAVENOUS at 10:30

## 2022-02-08 RX ADMIN — IPRATROPIUM BROMIDE AND ALBUTEROL SULFATE 1 AMPULE: 2.5; .5 SOLUTION RESPIRATORY (INHALATION) at 12:49

## 2022-02-08 RX ADMIN — IPRATROPIUM BROMIDE AND ALBUTEROL SULFATE 1 AMPULE: 2.5; .5 SOLUTION RESPIRATORY (INHALATION) at 16:24

## 2022-02-08 RX ADMIN — ENOXAPARIN SODIUM 40 MG: 100 INJECTION SUBCUTANEOUS at 10:22

## 2022-02-08 RX ADMIN — METHYLPREDNISOLONE SODIUM SUCCINATE 40 MG: 40 INJECTION, POWDER, LYOPHILIZED, FOR SOLUTION INTRAMUSCULAR; INTRAVENOUS at 17:11

## 2022-02-08 ASSESSMENT — PAIN SCALES - GENERAL
PAINLEVEL_OUTOF10: 0

## 2022-02-08 NOTE — ED NOTES
I called RN at [address] [place] [address] to inform transport here and that they would have to give the vancomycin on arrival to his room.      Heidi Hewitt, ALAINA  02/07/22 Dianelys Hatch

## 2022-02-08 NOTE — PROGRESS NOTES
[facility] Progress Note    Admitting Date and Time: 2/7/2022  8:10 PM  Admit Dx: Pneumonia [J18.9]    Subjective:  Patient is being followed for Pneumonia [J18.9]     Pt a bit better than admission. No events overnight. Less SOB. Tolerating PO. No fevers. Per RN: nothing to report    ROS: denies fever, chills, cp, sob, n/v, HA unless stated above.       lidocaine PF  5 mL IntraDERmal Once    sodium chloride flush  5-40 mL IntraVENous 2 times per day    heparin flush  3 mL IntraVENous 2 times per day    ampicillin-sulbactam  3,000 mg IntraVENous Q6H    sodium chloride flush  5-40 mL IntraVENous 2 times per day    enoxaparin  40 mg SubCUTAneous Daily    ipratropium-albuterol  1 ampule Inhalation Q4H WA    methylPREDNISolone  40 mg IntraVENous Q6H    Followed by   Jamia Friend ON 2/10/2022] predniSONE  40 mg Oral Daily    losartan  25 mg Oral Daily     sodium chloride flush, 5-40 mL, PRN  sodium chloride, 25 mL, PRN  heparin flush, 3 mL, PRN  sodium chloride flush, 5-40 mL, PRN  sodium chloride, 25 mL, PRN  ondansetron, 4 mg, Q8H PRN   Or  ondansetron, 4 mg, Q6H PRN  polyethylene glycol, 17 g, Daily PRN  acetaminophen, 650 mg, Q6H PRN   Or  acetaminophen, 650 mg, Q6H PRN  perflutren lipid microspheres, 1.5 mL, ONCE PRN         Objective:    BP (!) 149/90   Pulse 79   Temp 97.8 °F (36.6 °C) (Oral)   Resp 18   Ht 6' 1\" (1.854 m)   Wt 190 lb 14.4 oz (86.6 kg)   SpO2 93%   BMI 25.19 kg/m²   General Appearance: alert and oriented to person, place and time and in no acute distress, wife present  Skin: warm and dry, good turgor, no jaundice  Head: normocephalic and atraumatic  Eyes: pupils equal, round, and reactive to light, extraocular eye movements intact, conjunctivae normal  Neck: neck supple and non tender without mass   Pulmonary/Chest: bilateral expiratory wheezes, no rales or rhonchi, normal air movement, no respiratory distress on 3 liters sitting up in bed  Cardiovascular: normal rate, normal S1 and S2 and no carotid bruits  Abdomen: soft, non-tender, non-distended, normal bowel sounds, no masses or organomegaly  Extremities: no cyanosis, no clubbing and no edema  Neurologic: no cranial nerve deficit and speech normal        Recent Labs     02/07/22  1110 02/07/22  2155 02/08/22  0428    137 140   K 4.0 4.1 4.2   CL 97* 96* 101   CO2 29 31* 30*   BUN 14 17 18   CREATININE 1.0 0.9 0.9   GLUCOSE 132* 268* 179*   CALCIUM 9.4 8.7 9.2       Recent Labs     02/07/22  1110 02/07/22  2155 02/08/22  0428   WBC 11.4 9.1 9.4   RBC 4.77 4.65 4.58   HGB 15.0 14.1 14.2   HCT 43.7 43.5 43.3   MCV 91.6 93.5 94.5   MCH 31.4 30.3 31.0   MCHC 34.3 32.4 32.8   RDW 12.9 12.9 12.8    227 240   MPV 8.9 8.6 8.6       Radiology:   None new    ECHO -- TTE -- pending    Assessment:    Active Problems:    Pneumonia  Resolved Problems:    * No resolved hospital problems. *      Plan:  1. Pneumonia with cavitary/non-cavitary lesions bilaterally with RUL consolidation  - Continue antibiotics per ID -- Dr. Alva Haro has started Unasyn for now  - Check TTE to eval for source of possible septic emboli  - Check CRP, ESR, complement, anti gbm ab, anca, katarina for possible autoimmune vs vasculitis  - Check strep legionella ag, resp panel, sputum cx, procal  - Blood cultures pending  - Pulm consulted     2. Hx COPD -- on oxygen/steroid/or neb dependent; normally on RA  - Medrol with wean to prednisone  - Wean oxygen as able  - PRN nebs   - Not in inhalers at home --needs inhaler regimen for D/C  - Pulm consulted     3. Hx HTN -- suboptimal control here  -Resume home cozaar  -Trend BP     Code Status: Full  DVT prophylaxis: Lovenox  Nutrition: Regular diet  Activity: Up as tolerated      NOTE: This report was transcribed using voice recognition software. Every effort was made to ensure accuracy; however, inadvertent computerized transcription errors may be present.   Electronically signed by Rachel Whiting DO on 2/8/2022 at 4:57 PM

## 2022-02-08 NOTE — CONSULTS
Pulmonary Consultation    Admit Date: 2/7/2022  Requesting Physician: Suman Yost MD    Reason for consultation:  · Possible cavitary pneumonia versus autoimmune disease such as granulomatosis with polyangiitis  HPI:  · Patient is a 54year old male who presents to ER with increasing shortness of breath for one week. Since being admitted he has required oxygen therapy, IV steroids, and did receive antibiotics. His only medical history is hypertension and hyperlipidemia. He states that there is associated cough with the shortness of breath but it is infrequent and non-productive. He had a fever and chills last Thursday with his dyspnea beginning on Tuesday while working on his car. · Patient is a life long smoker of about 35 years and about 1.5 packs per day. He denies every seeing a pulmonary physician previously. He denies any autoimmune disease or lung disease. · Currently, patient is wearing 5L nasal cannula at 94% SpO2. According to the patient's wife, he had a fever up to 102 degrees at home but then started taking Tylenol and went down the. She notes that he wheezes almost constantly at night and was given inhaler in the past.  She questions whether or not his work in a cold environment as a  could be aggravating this. PMH:    Past Medical History:   Diagnosis Date    Chronic back pain     Hyperlipidemia     Hypertension     Leg pain     Numbness and tingling BUTTOCKS, THIGH, GROIN     PSH:   No past surgical history on file. Review of Systems:    · Constitutional: As noted in the HPI. · Eyes: No visual changes or diplopia. No scleral icterus. · ENT: No headaches, hearing loss or vertigo. No nasal congestion, or sore throat. · Cardiovascular: No chest pain, dyspnea on exertion, or palpitations. · Respiratory: See above  · Gastrointestinal: No abdominal pain, nausea or emesis. No diarrhea or rectal bleeding or melena. No change in bowel habits. · Genitourinary: No dysuria, urinary frequency, or incontinence. No hematuria. · Musculoskeletal: No gait disturbance, weakness or joint complaints. · Integumentary: No rash or pruritis. No abnormal pigmentation, hair or nail changes. · Neurological: No headache, diplopia, dizziness, tremor, change in muscle strength, numbness or tingling. No change in gait, balance, coordination, mood, affect, memory, mentation, behavior. · Psychiatric: No anxiety or depression. · Endocrine: No temperature intolerance, excessive thirst, fluid intake, urinary frequency, excessive appetite, or recent weight change. · Hematologic/Lymphatic: No abnormal bruising or bleeding, blood clots or swollen lymph nodes. No anemia, fever, chills, night sweats, or swollen glands. · Allergic/Immunologic: No seasonal or perenial allergies. No history of hives or atopic dermatitis. Social History:  · Alcohol:  Denies  · Tobacco:   1.5 ppd for 35 years  · Employment:  no silica or asbestos exposure  · Family:  No family history of lung disease    Medications:   sodium chloride        sodium chloride flush  5-40 mL IntraVENous 2 times per day    enoxaparin  40 mg SubCUTAneous Daily    ipratropium-albuterol  1 ampule Inhalation Q4H WA    methylPREDNISolone  40 mg IntraVENous Q6H    Followed by   Kelsey Canada ON 2/10/2022] predniSONE  40 mg Oral Daily    losartan  25 mg Oral Daily       Vitals:  Tmax:  VITALS:  BP (!) 149/90   Pulse 79   Temp 97.8 °F (36.6 °C) (Oral)   Resp 18   Ht 6' 1\" (1.854 m)   Wt 190 lb 14.4 oz (86.6 kg)   SpO2 93%   BMI 25.19 kg/m²   24HR INTAKE/OUTPUT:  No intake or output data in the 24 hours ending 22 1150  CURRENT PULSE OXIMETRY:  SpO2: 93 %  24HR PULSE OXIMETRY RANGE:  SpO2  Av.6 %  Min: 92 %  Max: 97 %    EXAM:  General: No distress. Alert. Eyes: PERRL. No sclera icterus. No conjunctival injection. ENT: No discharge. Pharynx clear. Very poor dentition. Neck: Trachea midline.  Normal thyroid. No jvd, no hjr. Resp: Diffuse expiratory wheezing. No accessory muscle use. no rales. bilateral rhonchi. CV: Regular rate. Regular rhythm. No murmur No rub. Abd: Non-tender. Non-distended. No masses. No organmegaly. Normal bowel sounds. Skin: Warm and dry. No nodule on exposed extremities. No rash on exposed extremities. Lymph: No cervical LAD. No supraclavicular LAD. Ext: No joint deformity. No clubbing. No cyanosis. No edema  Neuro: Awake. Follows commands. Positive pupils/gag/corneals. Normal pain response. Lab Results:  CBC:   Recent Labs     02/07/22  1110 02/07/22 2155 02/08/22  0428   WBC 11.4 9.1 9.4   HGB 15.0 14.1 14.2   HCT 43.7 43.5 43.3   MCV 91.6 93.5 94.5    227 240       BMP:  Recent Labs     02/07/22 1110 02/07/22 2155 02/08/22  0428    137 140   K 4.0 4.1 4.2   CL 97* 96* 101   CO2 29 31* 30*   BUN 14 17 18   CREATININE 1.0 0.9 0.9    ALB:3,BILIDIR:3,BILITOT:3,ALKPHOS:3)@    PT/INR: No results for input(s): PROTIME, INR in the last 72 hours. Cultures:  Sputum: not available  Blood: not available    ABG:   No results for input(s): PH, PO2, PCO2, HCO3, BE, O2SAT, METHB, O2HB, COHB, O2CON, HHB, THB in the last 72 hours. Films:     . Assessment:  1. Likely, COPD  2. Positive Coronavirus OC43 by PCR  3. Right upper lobe posterior infiltrate with cavitation: Likely infectious in origin given the history. Less likely would be malignancy underlying connective tissue disease. Plan:  1. Continue Duonebs   2. Continue IV Solumedrol 40mg Q6H   3. Wean oxygen as tolerated. Baseline is room air. 4. Await infectious disease opinion regarding antimicrobials  5. The patient will need a follow-up CT scan as an outpatient in the next month to verify clearing of the infiltrate. If it does not clear, biopsy would be indicated. 6. Smoking cessation was discussed      Thanks for letting us see this patient in consultation.   Total time in reviewing the previous admissions and records, reviewing the current x-rays, labs, and discussing with clinical staff including nursing and physicians, exceeded 50 minutes. Please contact us with any questions. Office [phone] or after hours through [employer], x [phone]. Please note that voice recognition technology was used (while wearing a Covid mandated mask) in the preparation of this note and make therefore it may contain inadvertent transcription errors. If the patient is a COVID 19 isolation patient, the above physical exam reflects that of the examining physician for the day.     MARIBEL Mi - NP    [facility], [address], [address], [facility]

## 2022-02-08 NOTE — PROGRESS NOTES
P Quality Flow/Interdisciplinary Rounds Progress Note        Quality Flow Rounds held on February 8, 2022    Disciplines Attending:  Bedside Nurse, ,  and Nursing Unit Leadership    Ramonita Goodman was admitted on 2/7/2022  8:10 PM    Anticipated Discharge Date:  Expected Discharge Date: 02/10/22    Disposition:    Serjio Score:  Serjio Scale Score: 21    Readmission Risk              Risk of Unplanned Readmission:  8           Discussed patient goal for the day, patient clinical progression, and barriers to discharge. The following Goal(s) of the Day/Commitment(s) have been identified:  IV solumedrol q6h.       Abhinav Tee RN  February 8, 2022

## 2022-02-08 NOTE — CONSULTS
[address] [place] [address] [facility]  Consultation Note     Admit Date: 2/7/2022  8:10 PM    Reason for Consult:   [facility] on call. Possible cavitary pneumonia on CT    Attending Physician:  Tru Gaston DO    HISTORY OF PRESENT ILLNESS:             The history is obtained from extensive review of available past medical records. The patient is a 54 y.o. male who is not known to the ID service. [facility] ED [facility]/7/2021 with 1 week history of shortness of breath, fatigue, body aches, fevers, chills and a productive cough. The chest showed multiple nodules. A few other however cavitating. He was treated with Cefepime and admitted to the hospital for coronavirus OC43. Patient also says that he was having rhinorrhea and some green nasal drainage. Today he feels better. He is not as short of breath but he is on oxygen. He has a long life smoker as well. He was seen by pulmonary. He was told this was an infectious process. Past Medical History:        Diagnosis Date    Chronic back pain     Hyperlipidemia     Hypertension     Leg pain     Numbness and tingling BUTTOCKS, THIGH, GROIN     Past Surgical History:    No past surgical history on file.   Current Medications:   Scheduled Meds:   sodium chloride flush  5-40 mL IntraVENous 2 times per day    enoxaparin  40 mg SubCUTAneous Daily    ipratropium-albuterol  1 ampule Inhalation Q4H WA    methylPREDNISolone  40 mg IntraVENous Q6H    Followed by   Halima Garcia ON 2/10/2022] predniSONE  40 mg Oral Daily    losartan  25 mg Oral Daily     Continuous Infusions:   sodium chloride       PRN Meds:sodium chloride flush, sodium chloride, ondansetron **OR** ondansetron, polyethylene glycol, acetaminophen **OR** acetaminophen, perflutren lipid microspheres    Allergies:  Pcn [penicillins]    Social History:   Social History     Socioeconomic History    Marital status:      Spouse name: Not on file    Number of children: Not on file    Years of education: Not on file    Highest education level: Not on file   Occupational History    Not on file   Tobacco Use    Smoking status: Current Every Day Smoker     Packs/day: 1.50    Smokeless tobacco: Never Used   Substance and Sexual Activity    Alcohol use: No    Drug use: No    Sexual activity: Not on file   Other Topics Concern    Not on file   Social History Narrative    Not on file     Social Determinants of Health     Financial Resource Strain:     Difficulty of Paying Living Expenses: Not on file   Food Insecurity:     Worried About Running Out of Food in the Last Year: Not on file    Morteza of Food in the Last Year: Not on file   Transportation Needs:     Lack of Transportation (Medical): Not on file    Lack of Transportation (Non-Medical): Not on file   Physical Activity:     Days of Exercise per Week: Not on file    Minutes of Exercise per Session: Not on file   Stress:     Feeling of Stress : Not on file   Social Connections:     Frequency of Communication with Friends and Family: Not on file    Frequency of Social Gatherings with Friends and Family: Not on file    Attends Che[employer] Services: Not on file    Active Member of [address] [place] [address] or Organizations: Not on file    Attends Club or Organization Meetings: Not on file    Marital Status: Not on file   Intimate Partner Violence:     Fear of Current or Ex-Partner: Not on file    Emotionally Abused: Not on file    Physically Abused: Not on file    Sexually Abused: Not on file   Housing Stability:     Unable to Pay for Housing in the Last Year: Not on file    Number of [place] in the Last Year: Not on file    Unstable Housing in the Last Year: Not on file      Pets: None  Travel: No  The patient lives at home with his wife.   He works as a     Family History:       Problem Relation Age of Onset    Cancer Mother     High Blood Pressure Father     High Cholesterol Father     Heart Disease Father    . Otherwise non-pertinent to the chief complaint. REVIEW OF SYSTEMS:    Constitutional: Positive for fevers, chills, diaphoresis  Neurologic: Negative   Psychiatric: Negative  Rheumatologic: Negative   Endocrine: Negative  Hematologic: Negative  Immunologic: Overdue for a booster for SARS-CoV-2  ENT: As in HPI poor dentition  Respiratory: As in the HPI  Cardiovascular: Negative  GI: Negative  : Negative  Musculoskeletal: Negative  Skin: No rashes. PHYSICAL EXAM:    Vitals:   BP (!) 149/90   Pulse 79   Temp 97.8 °F (36.6 °C) (Oral)   Resp 18   Ht 6' 1\" (1.854 m)   Wt 190 lb 14.4 oz (86.6 kg)   SpO2 93%   BMI 25.19 kg/m²   Constitutional: The patient is awake, alert, and oriented. Wife present. Skin: Warm and dry. No rashes were noted. HEENT: Eyes show round, and reactive pupils. No jaundice. Moist mucous membranes, no ulcerations, no thrush. Front upper teeth are rotting. Neck: Supple to movements. No lymphadenopathy. Chest: No use of accessory muscles to breathe. Symmetrical expansion. Auscultation reveals scattered crackles  Cardiovascular: S1 and S2 are rhythmic and regular. No murmurs appreciated. Abdomen: Positive bowel sounds to auscultation. Benign to palpation. No masses felt. No hepatosplenomegaly. Extremities: No clubbing, no cyanosis, no edema. Lines: Peripheral.      CBC+dif:  Recent Labs     02/07/22  1110 02/07/22  1110 02/07/22  2155 02/07/22  2155 02/08/22  0428   WBC 11.4  --  9.1  --  9.4   HGB 15.0   < > 14.1   < > 14.2   HCT 43.7   < > 43.5   < > 43.3   MCV 91.6   < > 93.5   < > 94.5      < > 227   < > 240   NEUTROABS 9.21*   < > 8.11*   < > 8.20*    < > = values in this interval not displayed.      Lab Results   Component Value Date    CRP 16.0 (H) 02/07/2022    CRP 0.8 (H) 02/11/2017      No results found for: CRP  Lab Results   Component Value Date    SEDRATE 60 (H) 02/07/2022    SEDRATE 20 (H) 02/11/2017     Lab Results   Component Value Date ALT 8 02/08/2022    AST 11 02/08/2022    ALKPHOS 84 02/08/2022    BILITOT 0.2 02/08/2022     Lab Results   Component Value Date     02/08/2022    K 4.2 02/08/2022     02/08/2022    CO2 30 02/08/2022    BUN 18 02/08/2022    CREATININE 0.9 02/08/2022    GFRAA >60 02/08/2022    LABGLOM >60 02/08/2022    GLUCOSE 179 02/08/2022    GLUCOSE 98 05/26/2011    PROT 7.2 02/08/2022    LABALBU 3.7 02/08/2022    LABALBU 4.6 05/26/2011    CALCIUM 9.2 02/08/2022    BILITOT 0.2 02/08/2022    ALKPHOS 84 02/08/2022    AST 11 02/08/2022    ALT 8 02/08/2022       No results found for: PROTIME, INR    Lab Results   Component Value Date    TSH 1.349 05/26/2011       Lab Results   Component Value Date    COLORU Yellow 02/11/2017    PHUR 7.5 02/11/2017    WBCUA NONE 02/11/2017    WBCUA 2-5 05/26/2011    RBCUA NONE 02/11/2017    RBCUA NONE 08/17/2013    BACTERIA NONE 02/11/2017    CLARITYU Clear 02/11/2017    SPECGRAV 1.010 02/11/2017    LEUKOCYTESUR Negative 02/11/2017    UROBILINOGEN 0.2 02/11/2017    BILIRUBINUR Negative 02/11/2017    BILIRUBINUR NEGATIVE 05/26/2011    BLOODU Negative 02/11/2017    GLUCOSEU Negative 02/11/2017    GLUCOSEU NEGATIVE 05/26/2011       No results found for: HCO3, BE, O2SAT, PH, THGB, PCO2, PO2, TCO2  Radiology:      Microbiology:  Pending  No results for input(s): BC in the last 72 hours. No results for input(s): ORG in the last 72 hours. No results for input(s): [facility] in the last 72 hours. Recent Labs     02/08/22  0328   STREPNEUMAGU Presumptive negative- suggests no current or recent  pneumococcal infection. Infection due to Strep pneumoniae cannot be  ruled out since the antigen present in the sample  may be below the detection limit of the test.  Normal Range:Presumptive Negative       Recent Labs     02/08/22  0328   LP1UAG Presumptive Negative -suggesting no recent or current infections  with Legionella pneumophila serogroup 1.   Infection to Legionella cannot be ruled out since other serogroups  and species may cause infection, antigen may not be present in  early infection, or level of antigen may be below the  detection limit. Normal Range: Presumptive Negative       Recent Labs     02/08/22  1050   ASO 37     No results for input(s): CULTRESP in the last 72 hours. Recent Labs     02/07/22  2155   PROCAL 0.06       Assessment:  · Multifocal lung nodules with cavitation  · Possible multifocal pneumonia  · Cannot rule out right-sided endocarditis    Plan:    · Start Unasyn  · Check cultures, baseline ESR, CRP  · TTE   · PICC for IV antibiotics  · Will follow with you    Informed Consent for PICC:  I explained the risks, benefits, alternative options, and potential complications associated with insertion of Peripherally Inserted Central Catheter (PICC) with the patient prior to the procedure. Electronically signed by Ureil Arias MD on 2/8/2022 at 2:33 PM     Thank you for having us see this patient in consultation. I will be discussing this case with the treating physicians.     Uriel Arias MD  2:30 PM  2/8/2022

## 2022-02-08 NOTE — H&P
[facility] History and Physical      CHIEF COMPLAINT:    Shortness of breath    History of Present Illness:   Mr. Theone Gowers presented to the [facility] ED for dyspnea, productive cough, myalgias, fatigue, fever and chills that started Thursday. He attributes the symptoms to lying on the ground to change his brakes on his car - initially he thought the cold ground drove his initial symptoms but he grew more concerned after developing fever to 102F. 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 In the ED a CTA of the chest was done and revealed bilateral lung cavitary and non-cavitary nodules with RUL consolidation adjacent to the major fissure. Differentials were infectious vs autoimmune vs septic emboli. He does have a history of COPD and continues to smoke. He was started on antibiotics and transferred to [facility] for further management. Informant(s) for H&P:Patient    REVIEW OF SYSTEMS:  Review of Systems   Constitutional: Positive for activity change, appetite change, chills, fatigue and fever. HENT: Negative. Eyes: Negative. Respiratory: Positive for cough and shortness of breath. Cardiovascular: Negative. Gastrointestinal: Negative. Endocrine: Negative. Genitourinary: Negative. Musculoskeletal: Positive for myalgias. Skin: Negative. Allergic/Immunologic: Negative. Neurological: Negative. Hematological: Negative. Psychiatric/Behavioral: Negative. PMH:  Past Medical History:   Diagnosis Date    Chronic back pain     Hyperlipidemia     Hypertension     Leg pain     Numbness and tingling BUTTOCKS, THIGH, GROIN       Surgical History:  No past surgical history on file. Medications Prior to Admission:    Prior to Admission medications    Medication Sig Start Date End Date Taking?  Authorizing Provider   losartan (COZAAR) 25 MG tablet Take 25 mg by mouth daily   Yes Historical Provider, MD   ibuprofen (IBU) 800 MG tablet Take 1 tablet by mouth every 8 hours as needed for Pain Take with food. 3/9/20 3/14/20  MARIBEL Crabtree - CNP   gabapentin (NEURONTIN) 400 MG capsule Take 400 mg by mouth 3 times daily    Historical Provider, MD       Allergies:    Pcn [penicillins]    Social History:    reports that he has been smoking. He has been smoking about 1.50 packs per day. He has never used smokeless tobacco. He reports that he does not drink alcohol and does not use drugs. Family History:   family history includes Cancer in his mother; Heart Disease in his father; High Blood Pressure in his father; High Cholesterol in his father. PHYSICAL EXAM:  Vitals:  /79   Pulse 97   Temp 97.5 °F (36.4 °C) (Oral)   Resp 20   Ht 6' 1\" (1.854 m)   Wt 191 lb 5.8 oz (86.8 kg)   SpO2 92%   BMI 25.25 kg/m²     General Appearance: Alert and oriented to person, place and time. No acute distress  Skin: warm and dry  Head: Normocephalic and atraumatic. Poor dentition, missing numerous teeth, front uppers are badly decayed  Eyes:Ppupils equal, round, and reactive to light, extraocular eye movements intact, conjunctivae normal  Neck: Supple and non tender without mass   Pulmonary/Chest: Scattered inspiratory, expiratory wheezes. Normal air movement, no respiratory distress. On 6L NC. Loose rattling cough, unable to bring up sputum  Cardiovascular: Normal rate, normal S1 and S2. Heart sounds are distant. No murmur  Abdomen: Soft, non-tender, non-distended, normal bowel sounds. No rebound, rigidity or guarding  Extremities: No joint effusions or tenderness.  Symmetric ROM and strength  Neurologic: Clear speech, no facial asymmetry        LABS:  Recent Labs     02/07/22  1110      K 4.0   CL 97*   CO2 29   BUN 14   CREATININE 1.0   GLUCOSE 132*   CALCIUM 9.4       Recent Labs     02/07/22  1110   WBC 11.4   RBC 4.77   HGB 15.0   HCT 43.7   MCV 91.6   MCH 31.4   MCHC 34.3   RDW 12.9    MPV 8.9       Radiology:   CTA Chest     Impression   1. No evidence of pulmonary embolism. 2. There are multiple cavitary and non cavitary nodules within the bilateral   lungs as well as consolidation in the right upper lobe adjacent to the major   fissure.  This could be secondary to infection.  The differential diagnosis   includes autoimmune disease such as granulomatosis with polyangiitis and   rheumatoid arthritis. Velma Chepamman could represent septic emboli.  Primary   malignancy and cavitary metastases from such entities as squamous cell   carcinoma should also be considered. 3. Emphysema. EKG:   Sinus tachycardia  Right atrial enlargement  Left axis deviation  Anterior infarct , age undetermined  Abnormal ECG  No previous ECGs available    ASSESSMENT:      Active Problems:    Pneumonia  Resolved Problems:    * No resolved hospital problems. *      PLAN:    1. Pneumonia with cavitary/non-cavitary lesions bilaterally with RUL consolidation  - Continue antibiotics with cefepime and vanc  - Check TTE to eval for source of possible septic emboli  - Check CRP, ESR, complement, anti gbm ab, anca, katarina for possible autoimmune vs vasculitis  - Check strep legionella ag, resp panel, sputum cx, procal  - Blood cultures pending  - Consult pulm for possible pulmonary autoimmune process    2. Hx COPD   - Medrol with wean to prednisone  - Wean oxygen as able  - PRN nebs   - Not in inhalers at home  - Pulm consulted    3. Hx HTN  - Resume home cozaar    Code Status: Full  DVT prophylaxis: Lovenox  Nutrition: Regular diet  Activity: Up as tolerated    NOTE: This report was transcribed using voice recognition software. Every effort was made to ensure accuracy; however, inadvertent computerized transcription errors may be present.   Electronically signed by MARIBEL Perez CNP on 2/7/2022 at 8:43 PM

## 2022-02-09 LAB
ALBUMIN SERPL-MCNC: 3.6 G/DL (ref 3.5–5.2)
ALP BLD-CCNC: 76 U/L (ref 40–129)
ALT SERPL-CCNC: 13 U/L (ref 0–40)
ANION GAP SERPL CALCULATED.3IONS-SCNC: 9 MMOL/L (ref 7–16)
AST SERPL-CCNC: 16 U/L (ref 0–39)
BASOPHILS ABSOLUTE: 0.01 E9/L (ref 0–0.2)
BASOPHILS RELATIVE PERCENT: 0.1 % (ref 0–2)
BILIRUB SERPL-MCNC: <0.2 MG/DL (ref 0–1.2)
BUN BLDV-MCNC: 22 MG/DL (ref 6–20)
CALCIUM SERPL-MCNC: 9.2 MG/DL (ref 8.6–10.2)
CHLORIDE BLD-SCNC: 101 MMOL/L (ref 98–107)
CO2: 31 MMOL/L (ref 22–29)
CREAT SERPL-MCNC: 0.9 MG/DL (ref 0.7–1.2)
EOSINOPHILS ABSOLUTE: 0 E9/L (ref 0.05–0.5)
EOSINOPHILS RELATIVE PERCENT: 0 % (ref 0–6)
GFR AFRICAN AMERICAN: >60
GFR NON-AFRICAN AMERICAN: >60 ML/MIN/1.73
GLUCOSE BLD-MCNC: 169 MG/DL (ref 74–99)
HCT VFR BLD CALC: 41.2 % (ref 37–54)
HEMOGLOBIN: 13.2 G/DL (ref 12.5–16.5)
IMMATURE GRANULOCYTES #: 0.05 E9/L
IMMATURE GRANULOCYTES %: 0.3 % (ref 0–5)
LYMPHOCYTES ABSOLUTE: 1.02 E9/L (ref 1.5–4)
LYMPHOCYTES RELATIVE PERCENT: 6.4 % (ref 20–42)
MCH RBC QN AUTO: 30.5 PG (ref 26–35)
MCHC RBC AUTO-ENTMCNC: 32 % (ref 32–34.5)
MCV RBC AUTO: 95.2 FL (ref 80–99.9)
MONOCYTES ABSOLUTE: 0.55 E9/L (ref 0.1–0.95)
MONOCYTES RELATIVE PERCENT: 3.5 % (ref 2–12)
NEUTROPHILS ABSOLUTE: 14.22 E9/L (ref 1.8–7.3)
NEUTROPHILS RELATIVE PERCENT: 89.7 % (ref 43–80)
PDW BLD-RTO: 13 FL (ref 11.5–15)
PLATELET # BLD: 265 E9/L (ref 130–450)
PMV BLD AUTO: 8.8 FL (ref 7–12)
POTASSIUM REFLEX MAGNESIUM: 4.2 MMOL/L (ref 3.5–5)
PROCALCITONIN: <0.02 NG/ML (ref 0–0.08)
RBC # BLD: 4.33 E12/L (ref 3.8–5.8)
SODIUM BLD-SCNC: 141 MMOL/L (ref 132–146)
TOTAL PROTEIN: 6.6 G/DL (ref 6.4–8.3)
WBC # BLD: 15.9 E9/L (ref 4.5–11.5)

## 2022-02-09 PROCEDURE — 6370000000 HC RX 637 (ALT 250 FOR IP): Performed by: NURSE PRACTITIONER

## 2022-02-09 PROCEDURE — 85025 COMPLETE CBC W/AUTO DIFF WBC: CPT

## 2022-02-09 PROCEDURE — 2580000003 HC RX 258: Performed by: SPECIALIST

## 2022-02-09 PROCEDURE — 2700000000 HC OXYGEN THERAPY PER DAY

## 2022-02-09 PROCEDURE — 6360000002 HC RX W HCPCS: Performed by: SPECIALIST

## 2022-02-09 PROCEDURE — 99232 SBSQ HOSP IP/OBS MODERATE 35: CPT | Performed by: FAMILY MEDICINE

## 2022-02-09 PROCEDURE — 2060000000 HC ICU INTERMEDIATE R&B

## 2022-02-09 PROCEDURE — 6360000002 HC RX W HCPCS: Performed by: NURSE PRACTITIONER

## 2022-02-09 PROCEDURE — 2580000003 HC RX 258: Performed by: REGISTERED NURSE

## 2022-02-09 PROCEDURE — 94640 AIRWAY INHALATION TREATMENT: CPT

## 2022-02-09 PROCEDURE — 6370000000 HC RX 637 (ALT 250 FOR IP): Performed by: FAMILY MEDICINE

## 2022-02-09 PROCEDURE — 6370000000 HC RX 637 (ALT 250 FOR IP): Performed by: INTERNAL MEDICINE

## 2022-02-09 PROCEDURE — 80053 COMPREHEN METABOLIC PANEL: CPT

## 2022-02-09 PROCEDURE — 36415 COLL VENOUS BLD VENIPUNCTURE: CPT

## 2022-02-09 RX ORDER — NICOTINE 21 MG/24HR
1 PATCH, TRANSDERMAL 24 HOURS TRANSDERMAL DAILY
Status: DISCONTINUED | OUTPATIENT
Start: 2022-02-09 | End: 2022-02-10 | Stop reason: HOSPADM

## 2022-02-09 RX ORDER — POLYETHYLENE GLYCOL 3350 17 G
2 POWDER IN PACKET (EA) ORAL
Status: DISCONTINUED | OUTPATIENT
Start: 2022-02-09 | End: 2022-02-10 | Stop reason: HOSPADM

## 2022-02-09 RX ORDER — IPRATROPIUM BROMIDE AND ALBUTEROL SULFATE 2.5; .5 MG/3ML; MG/3ML
1 SOLUTION RESPIRATORY (INHALATION) EVERY 4 HOURS
Status: DISCONTINUED | OUTPATIENT
Start: 2022-02-09 | End: 2022-02-10 | Stop reason: HOSPADM

## 2022-02-09 RX ADMIN — SODIUM CHLORIDE, PRESERVATIVE FREE 10 ML: 5 INJECTION INTRAVENOUS at 20:25

## 2022-02-09 RX ADMIN — METHYLPREDNISOLONE SODIUM SUCCINATE 40 MG: 40 INJECTION, POWDER, LYOPHILIZED, FOR SOLUTION INTRAMUSCULAR; INTRAVENOUS at 05:13

## 2022-02-09 RX ADMIN — IPRATROPIUM BROMIDE AND ALBUTEROL SULFATE 1 AMPULE: 2.5; .5 SOLUTION RESPIRATORY (INHALATION) at 09:21

## 2022-02-09 RX ADMIN — IPRATROPIUM BROMIDE AND ALBUTEROL SULFATE 1 AMPULE: 2.5; .5 SOLUTION RESPIRATORY (INHALATION) at 19:42

## 2022-02-09 RX ADMIN — AMPICILLIN SODIUM AND SULBACTAM SODIUM 3000 MG: 2; 1 INJECTION, POWDER, FOR SOLUTION INTRAMUSCULAR; INTRAVENOUS at 03:02

## 2022-02-09 RX ADMIN — AMPICILLIN SODIUM AND SULBACTAM SODIUM 3000 MG: 2; 1 INJECTION, POWDER, FOR SOLUTION INTRAMUSCULAR; INTRAVENOUS at 17:13

## 2022-02-09 RX ADMIN — LOSARTAN POTASSIUM 25 MG: 25 TABLET, FILM COATED ORAL at 08:52

## 2022-02-09 RX ADMIN — METHYLPREDNISOLONE SODIUM SUCCINATE 40 MG: 40 INJECTION, POWDER, LYOPHILIZED, FOR SOLUTION INTRAMUSCULAR; INTRAVENOUS at 10:10

## 2022-02-09 RX ADMIN — IPRATROPIUM BROMIDE AND ALBUTEROL SULFATE 1 AMPULE: 2.5; .5 SOLUTION RESPIRATORY (INHALATION) at 12:41

## 2022-02-09 RX ADMIN — AMPICILLIN SODIUM AND SULBACTAM SODIUM 3000 MG: 2; 1 INJECTION, POWDER, FOR SOLUTION INTRAMUSCULAR; INTRAVENOUS at 20:25

## 2022-02-09 RX ADMIN — SODIUM CHLORIDE, PRESERVATIVE FREE 10 ML: 5 INJECTION INTRAVENOUS at 08:54

## 2022-02-09 RX ADMIN — METHYLPREDNISOLONE SODIUM SUCCINATE 40 MG: 40 INJECTION, POWDER, LYOPHILIZED, FOR SOLUTION INTRAMUSCULAR; INTRAVENOUS at 17:13

## 2022-02-09 RX ADMIN — AMPICILLIN SODIUM AND SULBACTAM SODIUM 3000 MG: 2; 1 INJECTION, POWDER, FOR SOLUTION INTRAMUSCULAR; INTRAVENOUS at 08:55

## 2022-02-09 RX ADMIN — ENOXAPARIN SODIUM 40 MG: 100 INJECTION SUBCUTANEOUS at 08:53

## 2022-02-09 RX ADMIN — IPRATROPIUM BROMIDE AND ALBUTEROL SULFATE 1 AMPULE: 2.5; .5 SOLUTION RESPIRATORY (INHALATION) at 15:59

## 2022-02-09 ASSESSMENT — PAIN SCALES - GENERAL
PAINLEVEL_OUTOF10: 0

## 2022-02-09 NOTE — HOME CARE
81.66 PER DAY UNTIL DED AND OOP MET FOR IV MEDICATION   DED: $3500.00 ($3500.00 REMAINING)  OOP: $4500.00 ($4500.00 REMAINING)      FITZ LEONARD LPN   [facility]     SPOKE WITH WIFE SHE IS IN AGREEMENT WITH PRICING AND SETTING UP A PAYMENT ARRANGEMENT SHE IS ALSO WANTING TO APPLY FOR FINANCIAL ASSISTANCE FOR BOTH THE HOSPITAL AND [facility] NEEDS.

## 2022-02-09 NOTE — PROGRESS NOTES
Pulmonary Progress Note    Admit Date: 2022  Hospital day                               PCP: Duncan Leavitt MD    Chief Complaint (s):  Patient Active Problem List   Diagnosis    Hyperlipidemia    HTN (hypertension)    Pneumonia    COPD with acute exacerbation ([address]    Acute respiratory failure with hypoxia ([facility] [facility]    Cavitary lesion of lung    Smoker       Subjective:  · Patient seen resting in bed on 3L nasal cannula at 93%. He denies any productive cough, wheezing, or fever. He states he is feeling better. Auscultation proves bilateral wheeze in all lung lobes. Vitals:  VITALS:  /80   Pulse 62   Temp 97.4 °F (36.3 °C) (Oral)   Resp 18   Ht 6' 1\" (1.854 m)   Wt 194 lb (88 kg)   SpO2 93%   BMI 25.60 kg/m²     24HR INTAKE/OUTPUT:    No intake or output data in the 24 hours ending 22 1007    24HR PULSE OXIMETRY RANGE:    SpO2  Av.5 %  Min: 93 %  Max: 95 %    Medications:  IV:   sodium chloride      sodium chloride         Scheduled Meds:   lidocaine PF  5 mL IntraDERmal Once    sodium chloride flush  5-40 mL IntraVENous 2 times per day    heparin flush  3 mL IntraVENous 2 times per day    ampicillin-sulbactam  3,000 mg IntraVENous Q6H    sodium chloride flush  5-40 mL IntraVENous 2 times per day    enoxaparin  40 mg SubCUTAneous Daily    ipratropium-albuterol  1 ampule Inhalation Q4H WA    methylPREDNISolone  40 mg IntraVENous Q6H    Followed by   Leon Coates ON 2/10/2022] predniSONE  40 mg Oral Daily    losartan  25 mg Oral Daily       Diet:   ADULT DIET; Regular     EXAM:  General: No distress. Alert. Eyes: PERRL. No sclera icterus. No conjunctival injection. ENT: No discharge. Pharynx clear. Neck: Trachea midline. Normal thyroid. Resp: No accessory muscle use. no rales. bilateral wheezing. no rhonchi. CV: Regular rate. Regular rhythm. No murmur or rub. Abd: Non-tender. Non-distended. No masses. No organomegaly. Normal bowel sounds.    Skin: Warm and dry. No nodule on exposed extremities. No rash on exposed extremities. Ext: No cyanosis, clubbing, edema  Lymph: No cervical LAD. No supraclavicular LAD. M/S: No cyanosis. No joint deformity. No clubbing. Neuro: Awake. Follows commands. Positive pupils/gag/corneals. Normal pain response. Results:  CBC:   Recent Labs     02/07/22 2155 02/08/22 0428 02/09/22  0320   WBC 9.1 9.4 15.9*   HGB 14.1 14.2 13.2   HCT 43.5 43.3 41.2   MCV 93.5 94.5 95.2    240 265     BMP:   Recent Labs     02/07/22 2155 02/08/22 0428 02/09/22  0320    140 141   K 4.1 4.2 4.2   CL 96* 101 101   CO2 31* 30* 31*   BUN 17 18 22*   CREATININE 0.9 0.9 0.9     LIVER PROFILE:   Recent Labs     02/07/22 2155 02/08/22 0428 02/09/22  0320   AST 11 11 16   ALT 9 8 13   BILITOT 0.4 0.2 <0.2   ALKPHOS 78 84 76     PT/INR: No results for input(s): PROTIME, INR in the last 72 hours. APTT: No results for input(s): APTT in the last 72 hours. Pathology:  1. N/A      Microbiology:  1. None    Recent ABG:   No results for input(s): PH, PO2, PCO2, HCO3, BE, O2SAT, METHB, O2HB, COHB, O2CON, HHB, THB in the last 72 hours. Recent Films:  No orders to display       Assessment:  1. Likely, COPD  2. Positive Coronavirus OC43 by PCR  3. Right upper lobe posterior infiltrate with cavitation: Likely infectious in origin given the history. Less likely would be malignancy underlying connective tissue disease. 4. Leukocytosis. Steroid induced. Plan:  1. Continue Duonebs  2. Continue IV solumedrol 40mg Q6H. Possibly wean tomorrow. 3. Continue IV Unasyn per ID. Input appreciated. 4. Await Echo  5. Wean to room air, increase frequency of aerosols. The patient went from 4 PM yesterday ~9 AM today without 1.  6. Follow-up CT scan outpatient within the next month to verify clearing of infiltrate. If not cleared, biopsy indicated.    7. Smoking cessation    Time at the bedside, reviewing labs and radiographs, reviewing

## 2022-02-09 NOTE — PROGRESS NOTES
[address] [place] [address] [facility]  Progress Note    SUBJECTIVE:    Patient is tolerating medications. No nausea, vomiting, diarrhea. Minimal nonproductive cough   No fevers or chills     Review of systems:  As stated above in the chief complaint, otherwise negative. Medications:  Scheduled Meds:   lidocaine PF  5 mL IntraDERmal Once    sodium chloride flush  5-40 mL IntraVENous 2 times per day    heparin flush  3 mL IntraVENous 2 times per day    ampicillin-sulbactam  3,000 mg IntraVENous Q6H    sodium chloride flush  5-40 mL IntraVENous 2 times per day    enoxaparin  40 mg SubCUTAneous Daily    ipratropium-albuterol  1 ampule Inhalation Q4H WA    methylPREDNISolone  40 mg IntraVENous Q6H    Followed by   Kelsey Canada ON 2/10/2022] predniSONE  40 mg Oral Daily    losartan  25 mg Oral Daily     Continuous Infusions:   sodium chloride      sodium chloride       PRN Meds:sodium chloride flush, sodium chloride, heparin flush, sodium chloride flush, sodium chloride, ondansetron **OR** ondansetron, polyethylene glycol, acetaminophen **OR** acetaminophen, perflutren lipid microspheres    OBJECTIVE:  /80   Pulse 62   Temp 97.4 °F (36.3 °C) (Oral)   Resp 18   Ht 6' 1\" (1.854 m)   Wt 194 lb (88 kg)   SpO2 93%   BMI 25.60 kg/m²   Temp  Av.8 °F (36.6 °C)  Min: 97.4 °F (36.3 °C)  Max: 98 °F (36.7 °C)  Constitutional: The patient is awake, alert, and oriented. sitting up in bed, in no distress. Wife present   Skin: Warm and dry. No rashes were noted. HEENT: Round and reactive pupils. Moist mucous membranes. No ulcerations or thrush. Dentition is poor, teeth are rotting and breaking    Neck: Supple to movements. Chest: No respiratory distress . Symmetrical expansion. Bilateral scattered crackles   Cardiovascular: S1 and S2 are rhythmic and regular. No murmurs appreciated. Abdomen: Positive bowel sounds to auscultation. Benign to palpation. No masses felt.    Extremities: No

## 2022-02-09 NOTE — PROGRESS NOTES
P Quality Flow/Interdisciplinary Rounds Progress Note        Quality Flow Rounds held on February 9, 2022    Disciplines Attending:  Bedside Nurse, ,  and Nursing Unit Leadership    Juan David Webb was admitted on 2/7/2022  8:10 PM    Anticipated Discharge Date:  Expected Discharge Date: 02/10/22    Disposition:    Serjio Score:  Serjio Scale Score: 21    Readmission Risk              Risk of Unplanned Readmission:  11           Discussed patient goal for the day, patient clinical progression, and barriers to discharge. The following Goal(s) of the Day/Commitment(s) have been identified:  IV Unasyn q6h, IV solumedrol q6h.       Laura Rios RN  February 9, 2022

## 2022-02-09 NOTE — PROGRESS NOTES
[facility] Progress Note    Admitting Date and Time: 2/7/2022  8:10 PM  Admit Dx: Pneumonia [J18.9]    Subjective:  Patient is being followed for Pneumonia [J18.9]     Pt feels less SOB. Less wheezing/coughing. No fevers. No events overnight. Admits that he is starting to crave nicotine. No chest pain. Tolerating PO. Ambulating. Per RN: nothing to report    ROS: denies fever, chills, cp, sob, n/v, HA unless stated above.       ipratropium-albuterol  1 ampule Inhalation Q4H    nicotine  1 patch TransDERmal Daily    lidocaine PF  5 mL IntraDERmal Once    sodium chloride flush  5-40 mL IntraVENous 2 times per day    heparin flush  3 mL IntraVENous 2 times per day    ampicillin-sulbactam  3,000 mg IntraVENous Q6H    sodium chloride flush  5-40 mL IntraVENous 2 times per day    enoxaparin  40 mg SubCUTAneous Daily    methylPREDNISolone  40 mg IntraVENous Q6H    Followed by   Zoë Turpin ON 2/10/2022] predniSONE  40 mg Oral Daily    losartan  25 mg Oral Daily     nicotine polacrilex, 2 mg, Q2H PRN  sodium chloride flush, 5-40 mL, PRN  sodium chloride, 25 mL, PRN  heparin flush, 3 mL, PRN  sodium chloride flush, 5-40 mL, PRN  sodium chloride, 25 mL, PRN  ondansetron, 4 mg, Q8H PRN   Or  ondansetron, 4 mg, Q6H PRN  polyethylene glycol, 17 g, Daily PRN  acetaminophen, 650 mg, Q6H PRN   Or  acetaminophen, 650 mg, Q6H PRN  perflutren lipid microspheres, 1.5 mL, ONCE PRN         Objective:    /80   Pulse 62   Temp 97.4 °F (36.3 °C) (Oral)   Resp 18   Ht 6' 1\" (1.854 m)   Wt 194 lb (88 kg)   SpO2 93%   BMI 25.60 kg/m²   General Appearance: alert and oriented to person, place and time and in no acute distress  Skin: warm and dry  Head: normocephalic and atraumatic  Eyes: pupils equal, round, and reactive to light, extraocular eye movements intact, conjunctivae normal  Neck: neck supple and non tender without mass   Pulmonary/Chest: no wheezing today, resolved since yesterday, no rales or rhonchi, air movement overall diminished c/w his COPD hx, no respiratory distress on 3 liters NC sitting up in a chair  Cardiovascular: normal rate, normal S1 and S2 and no carotid bruits  Abdomen: soft, non-tender, non-distended, normal bowel sounds, no masses or organomegaly  Extremities: no cyanosis, no clubbing and no edema  Neurologic: no cranial nerve deficit and speech normal        Recent Labs     02/07/22 2155 02/08/22 0428 02/09/22  0320    140 141   K 4.1 4.2 4.2   CL 96* 101 101   CO2 31* 30* 31*   BUN 17 18 22*   CREATININE 0.9 0.9 0.9   GLUCOSE 268* 179* 169*   CALCIUM 8.7 9.2 9.2       Recent Labs     02/07/22 2155 02/08/22 0428 02/09/22  0320   WBC 9.1 9.4 15.9*   RBC 4.65 4.58 4.33   HGB 14.1 14.2 13.2   HCT 43.5 43.3 41.2   MCV 93.5 94.5 95.2   MCH 30.3 31.0 30.5   MCHC 32.4 32.8 32.0   RDW 12.9 12.8 13.0    240 265   MPV 8.6 8.6 8.8       Radiology:   TTE - pending    Assessment:    Principal Problem:    Acute respiratory failure with hypoxia (HCC)  Active Problems:    Pneumonia    COPD with acute exacerbation (HCC)    Cavitary lesion of lung    Smoker  Resolved Problems:    * No resolved hospital problems. *    Oxygenation -- about the same as yesterday. ? Possible has a chronic O2 need? Not septic or febrile. Plan:  . Pneumonia with cavitary/non-cavitary lesions bilaterally with RUL consolidation -- probably secondary pneumonia after resp viral infection  - Unasyn for 3 weeks via PICC  - Check TTE to eval for source of possible septic emboli -- pending  - Check CRP, ESR, complement, anti gbm ab, anca, katarina for possible autoimmune vs vasculitis  - Blood cultures pending  - Pulm consulted     2. Hx COPD -- NOT oxygen/steroid/or neb dependent; normally on RA  - Solu-Medrol with wean to prednisone -- likely wean tomorrow  - Wean oxygen as able  - PRN nebs   - Not in inhalers at home --needs inhaler regimen for D/C  - Pulm following     3.  Hx HTN -- suboptimal control here; better today  -Continue home cozaar  -Trend BP    4. Smoker, life-long  -Nicotine patch at 14 mg daily  -Nicotine lozenge prn     Code Status: Full  DVT prophylaxis: Lovenox  Nutrition: Regular diet  Activity: Up as tolerated  DISP: Discharge with [facility] -- about 1 to days; need to wean oxygen, and need to await ECHO, PICC line placement      NOTE: This report was transcribed using voice recognition software. Every effort was made to ensure accuracy; however, inadvertent computerized transcription errors may be present.   Electronically signed by Tracie Macias DO on 2/9/2022 at 4:01 PM

## 2022-02-10 VITALS
WEIGHT: 190.4 LBS | HEART RATE: 88 BPM | SYSTOLIC BLOOD PRESSURE: 156 MMHG | DIASTOLIC BLOOD PRESSURE: 93 MMHG | OXYGEN SATURATION: 94 % | BODY MASS INDEX: 25.23 KG/M2 | TEMPERATURE: 98.2 F | HEIGHT: 73 IN | RESPIRATION RATE: 18 BRPM

## 2022-02-10 LAB
ALBUMIN SERPL-MCNC: 3.4 G/DL (ref 3.5–5.2)
ALP BLD-CCNC: 70 U/L (ref 40–129)
ALT SERPL-CCNC: 15 U/L (ref 0–40)
ANION GAP SERPL CALCULATED.3IONS-SCNC: 12 MMOL/L (ref 7–16)
AST SERPL-CCNC: 15 U/L (ref 0–39)
BASOPHILS ABSOLUTE: 0.01 E9/L (ref 0–0.2)
BASOPHILS RELATIVE PERCENT: 0.1 % (ref 0–2)
BETA GLOBULIN: 0 AU/ML (ref 0–19)
BILIRUB SERPL-MCNC: <0.2 MG/DL (ref 0–1.2)
BUN BLDV-MCNC: 20 MG/DL (ref 6–20)
CALCIUM SERPL-MCNC: 8.9 MG/DL (ref 8.6–10.2)
CHLORIDE BLD-SCNC: 100 MMOL/L (ref 98–107)
CO2: 28 MMOL/L (ref 22–29)
CREAT SERPL-MCNC: 0.9 MG/DL (ref 0.7–1.2)
EOSINOPHILS ABSOLUTE: 0 E9/L (ref 0.05–0.5)
EOSINOPHILS RELATIVE PERCENT: 0 % (ref 0–6)
GFR AFRICAN AMERICAN: >60
GFR NON-AFRICAN AMERICAN: >60 ML/MIN/1.73
GLUCOSE BLD-MCNC: 129 MG/DL (ref 74–99)
HCT VFR BLD CALC: 40.2 % (ref 37–54)
HEMOGLOBIN: 13.3 G/DL (ref 12.5–16.5)
IMMATURE GRANULOCYTES #: 0.22 E9/L
IMMATURE GRANULOCYTES %: 1.6 % (ref 0–5)
LYMPHOCYTES ABSOLUTE: 1.56 E9/L (ref 1.5–4)
LYMPHOCYTES RELATIVE PERCENT: 11.6 % (ref 20–42)
MCH RBC QN AUTO: 30.9 PG (ref 26–35)
MCHC RBC AUTO-ENTMCNC: 33.1 % (ref 32–34.5)
MCV RBC AUTO: 93.5 FL (ref 80–99.9)
MONOCYTES ABSOLUTE: 0.77 E9/L (ref 0.1–0.95)
MONOCYTES RELATIVE PERCENT: 5.7 % (ref 2–12)
NEUTROPHILS ABSOLUTE: 10.88 E9/L (ref 1.8–7.3)
NEUTROPHILS RELATIVE PERCENT: 81 % (ref 43–80)
PDW BLD-RTO: 13.1 FL (ref 11.5–15)
PLATELET # BLD: 288 E9/L (ref 130–450)
PMV BLD AUTO: 8.7 FL (ref 7–12)
POTASSIUM REFLEX MAGNESIUM: 3.8 MMOL/L (ref 3.5–5)
RBC # BLD: 4.3 E12/L (ref 3.8–5.8)
SODIUM BLD-SCNC: 140 MMOL/L (ref 132–146)
TOTAL PROTEIN: 6.4 G/DL (ref 6.4–8.3)
WBC # BLD: 13.4 E9/L (ref 4.5–11.5)

## 2022-02-10 PROCEDURE — 36569 INSJ PICC 5 YR+ W/O IMAGING: CPT

## 2022-02-10 PROCEDURE — 2700000000 HC OXYGEN THERAPY PER DAY

## 2022-02-10 PROCEDURE — 2580000003 HC RX 258: Performed by: NURSE PRACTITIONER

## 2022-02-10 PROCEDURE — 36415 COLL VENOUS BLD VENIPUNCTURE: CPT

## 2022-02-10 PROCEDURE — 2580000003 HC RX 258: Performed by: SPECIALIST

## 2022-02-10 PROCEDURE — 99239 HOSP IP/OBS DSCHRG MGMT >30: CPT | Performed by: INTERNAL MEDICINE

## 2022-02-10 PROCEDURE — 2580000003 HC RX 258: Performed by: REGISTERED NURSE

## 2022-02-10 PROCEDURE — APPSS45 APP SPLIT SHARED TIME 31-45 MINUTES: Performed by: PHYSICIAN ASSISTANT

## 2022-02-10 PROCEDURE — 94640 AIRWAY INHALATION TREATMENT: CPT

## 2022-02-10 PROCEDURE — 6370000000 HC RX 637 (ALT 250 FOR IP): Performed by: NURSE PRACTITIONER

## 2022-02-10 PROCEDURE — 85025 COMPLETE CBC W/AUTO DIFF WBC: CPT

## 2022-02-10 PROCEDURE — 93308 TTE F-UP OR LMTD: CPT

## 2022-02-10 PROCEDURE — 6360000002 HC RX W HCPCS: Performed by: SPECIALIST

## 2022-02-10 PROCEDURE — 80053 COMPREHEN METABOLIC PANEL: CPT

## 2022-02-10 PROCEDURE — 02HV33Z INSERTION OF INFUSION DEVICE INTO SUPERIOR VENA CAVA, PERCUTANEOUS APPROACH: ICD-10-PCS | Performed by: INTERNAL MEDICINE

## 2022-02-10 PROCEDURE — C1751 CATH, INF, PER/CENT/MIDLINE: HCPCS

## 2022-02-10 PROCEDURE — 6370000000 HC RX 637 (ALT 250 FOR IP): Performed by: FAMILY MEDICINE

## 2022-02-10 PROCEDURE — 6370000000 HC RX 637 (ALT 250 FOR IP): Performed by: INTERNAL MEDICINE

## 2022-02-10 PROCEDURE — 2500000003 HC RX 250 WO HCPCS: Performed by: REGISTERED NURSE

## 2022-02-10 PROCEDURE — 76937 US GUIDE VASCULAR ACCESS: CPT

## 2022-02-10 PROCEDURE — 6360000002 HC RX W HCPCS: Performed by: NURSE PRACTITIONER

## 2022-02-10 RX ORDER — UMECLIDINIUM BROMIDE AND VILANTEROL TRIFENATATE 62.5; 25 UG/1; UG/1
1 POWDER RESPIRATORY (INHALATION) DAILY
Qty: 60 EACH | Refills: 0 | Status: SHIPPED | OUTPATIENT
Start: 2022-02-10

## 2022-02-10 RX ORDER — NICOTINE 21 MG/24HR
1 PATCH, TRANSDERMAL 24 HOURS TRANSDERMAL DAILY
Qty: 30 PATCH | Refills: 0 | Status: SHIPPED | OUTPATIENT
Start: 2022-02-11

## 2022-02-10 RX ORDER — PREDNISONE 10 MG/1
TABLET ORAL
Qty: 10 TABLET | Refills: 0 | Status: SHIPPED | OUTPATIENT
Start: 2022-02-10 | End: 2022-02-14

## 2022-02-10 RX ADMIN — LOSARTAN POTASSIUM 25 MG: 25 TABLET, FILM COATED ORAL at 10:17

## 2022-02-10 RX ADMIN — IPRATROPIUM BROMIDE AND ALBUTEROL SULFATE 1 AMPULE: 2.5; .5 SOLUTION RESPIRATORY (INHALATION) at 13:53

## 2022-02-10 RX ADMIN — LIDOCAINE HYDROCHLORIDE 1.5 ML: 10 INJECTION, SOLUTION EPIDURAL; INFILTRATION; INTRACAUDAL; PERINEURAL at 12:20

## 2022-02-10 RX ADMIN — SODIUM CHLORIDE, PRESERVATIVE FREE 10 ML: 5 INJECTION INTRAVENOUS at 10:18

## 2022-02-10 RX ADMIN — PREDNISONE 40 MG: 20 TABLET ORAL at 10:17

## 2022-02-10 RX ADMIN — SODIUM CHLORIDE, PRESERVATIVE FREE 10 ML: 5 INJECTION INTRAVENOUS at 10:32

## 2022-02-10 RX ADMIN — ENOXAPARIN SODIUM 40 MG: 100 INJECTION SUBCUTANEOUS at 10:18

## 2022-02-10 RX ADMIN — AMPICILLIN SODIUM AND SULBACTAM SODIUM 3000 MG: 2; 1 INJECTION, POWDER, FOR SOLUTION INTRAMUSCULAR; INTRAVENOUS at 10:22

## 2022-02-10 RX ADMIN — IPRATROPIUM BROMIDE AND ALBUTEROL SULFATE 1 AMPULE: 2.5; .5 SOLUTION RESPIRATORY (INHALATION) at 03:36

## 2022-02-10 RX ADMIN — AMPICILLIN SODIUM AND SULBACTAM SODIUM 3000 MG: 2; 1 INJECTION, POWDER, FOR SOLUTION INTRAMUSCULAR; INTRAVENOUS at 15:53

## 2022-02-10 RX ADMIN — AMPICILLIN SODIUM AND SULBACTAM SODIUM 3000 MG: 2; 1 INJECTION, POWDER, FOR SOLUTION INTRAMUSCULAR; INTRAVENOUS at 04:00

## 2022-02-10 RX ADMIN — SODIUM CHLORIDE, PRESERVATIVE FREE 20 ML: 5 INJECTION INTRAVENOUS at 12:30

## 2022-02-10 ASSESSMENT — PAIN SCALES - GENERAL
PAINLEVEL_OUTOF10: 0
PAINLEVEL_OUTOF10: 0

## 2022-02-10 NOTE — PROGRESS NOTES
Spoke with Dr Roxy Layne, results reviewed. OK for d/c to home on 2.5L oxygen. Spoke with Dr Bruno Lebron, notified of all consults signing off. He will come to see the pt and place d/c orders.

## 2022-02-10 NOTE — PLAN OF CARE
Problem: Falls - Risk of:  Goal: Will remain free from falls  Description: Will remain free from falls  2/10/2022 0951 by Berna Juárez RN  Outcome: Ongoing  2/9/2022 2330 by Roderick Baires RN  Outcome: Met This Shift  Goal: Absence of physical injury  Description: Absence of physical injury  2/10/2022 0951 by Berna Juárez RN  Outcome: Ongoing  2/9/2022 2330 by Roderick Baires RN  Outcome: Met This Shift

## 2022-02-10 NOTE — PROGRESS NOTES
Pulmonary Progress Note    Admit Date: 2022  Hospital day                               PCP: Jesse Lopez MD    Chief Complaint (s):  Patient Active Problem List   Diagnosis    Hyperlipidemia    HTN (hypertension)    Pneumonia    COPD with acute exacerbation ([address]    Acute respiratory failure with hypoxia ([address]    Cavitary lesion of lung    Smoker       Subjective:  · Patient seen resting in bed on 3L nasal cannula at 93%. He denies any productive cough, wheezing, or fever. He states he is feeling better. Auscultation with minimal wheeze, very much improved. Wife at the bedside. The patient seems ready for discharge. Vitals:  VITALS:  BP (!) 156/93   Pulse 88   Temp 98.2 °F (36.8 °C) (Oral)   Resp 18   Ht 6' 1\" (1.854 m)   Wt 190 lb 6.4 oz (86.4 kg)   SpO2 92%   BMI 25.12 kg/m²     24HR INTAKE/OUTPUT:    No intake or output data in the 24 hours ending 02/10/22 1036    24HR PULSE OXIMETRY RANGE:    SpO2  Av.6 %  Min: 92 %  Max: 95 %    Medications:  IV:   sodium chloride      sodium chloride         Scheduled Meds:   ipratropium-albuterol  1 ampule Inhalation Q4H    nicotine  1 patch TransDERmal Daily    lidocaine PF  5 mL IntraDERmal Once    sodium chloride flush  5-40 mL IntraVENous 2 times per day    heparin flush  3 mL IntraVENous 2 times per day    ampicillin-sulbactam  3,000 mg IntraVENous Q6H    sodium chloride flush  5-40 mL IntraVENous 2 times per day    enoxaparin  40 mg SubCUTAneous Daily    predniSONE  40 mg Oral Daily    losartan  25 mg Oral Daily       Diet:   ADULT DIET; Regular     EXAM:  General: No distress. Alert. Eyes: PERRL. No sclera icterus. No conjunctival injection. ENT: No discharge. Pharynx clear. Neck: Trachea midline. Normal thyroid. Resp: No accessory muscle use. no rales. bilateral wheezing. no rhonchi. CV: Regular rate. Regular rhythm. No murmur or rub. Abd: Non-tender. Non-distended. No masses. No organomegaly. Normal bowel sounds. Skin: Warm and dry. No nodule on exposed extremities. No rash on exposed extremities. Ext: No cyanosis, clubbing, edema  Lymph: No cervical LAD. No supraclavicular LAD. M/S: No cyanosis. No joint deformity. No clubbing. Neuro: Awake. Follows commands. Positive pupils/gag/corneals. Normal pain response. Results:  CBC:   Recent Labs     02/08/22  0428 02/09/22  0320 02/10/22  0413   WBC 9.4 15.9* 13.4*   HGB 14.2 13.2 13.3   HCT 43.3 41.2 40.2   MCV 94.5 95.2 93.5    265 288     BMP:   Recent Labs     02/08/22  0428 02/09/22  0320 02/10/22  0413    141 140   K 4.2 4.2 3.8    101 100   CO2 30* 31* 28   BUN 18 22* 20   CREATININE 0.9 0.9 0.9     LIVER PROFILE:   Recent Labs     02/08/22  0428 02/09/22  0320 02/10/22  0413   AST 11 16 15   ALT 8 13 15   BILITOT 0.2 <0.2 <0.2   ALKPHOS 84 76 70     PT/INR: No results for input(s): PROTIME, INR in the last 72 hours. APTT: No results for input(s): APTT in the last 72 hours. Pathology:  1. N/A      Microbiology:  1. None    Recent ABG:   No results for input(s): PH, PO2, PCO2, HCO3, BE, O2SAT, METHB, O2HB, COHB, O2CON, HHB, THB in the last 72 hours. Recent Films:  No orders to display       Assessment:  1. Likely, COPD  2. Positive Coronavirus OC43 by PCR  3. Right upper lobe posterior infiltrate with cavitation: Likely infectious in origin given the history. Less likely would be malignancy underlying connective tissue disease. 4. Leukocytosis. Steroid induced. Improving. Plan:  1. Continue Duonebs  2. Continue PO prednisone, this should suffice with his continued improvement. 3. Continue IV Unasyn per ID. Input appreciated. 4. PFT outpatient. 5. Wean to room air. 6. Follow-up CT scan outpatient within the next month to verify clearing of infiltrate. If not cleared, biopsy indicated. 7. Smoking cessation  8. Will need LAMA/LABA for outpatient inhaler regimen.  Discussed with primary service. Time at the bedside, reviewing labs and radiographs, reviewing updated notes and consultations, discussing with staff and family was more than 35 minutes. Please note that voice recognition technology was used in the preparation of this note and make therefore it may contain inadvertent transcription errors. If the patient is a COVID 19 isolation patient, the above physical exam reflects that of the examining physician for the day.         MARIBEL Saeed - NP    [facility], [address] [place] [address], [address], [facility], [facility]

## 2022-02-10 NOTE — PROGRESS NOTES
Paged Dr Jackie Nichols per [facility] NP request to have him reconcile the steroids and inhalers for d/c.

## 2022-02-10 NOTE — DISCHARGE SUMMARY
cavitary/non-cavitary lesions bilaterally with RUL consolidation: ID and Pulmonary consulted. Continue Unasyn for 3 weeks. PICC placed. Ok to [employer] from ID standpoint. ECHO 2/10/2022 showed EF 45-50%, hypokinesis of apical and inferoapical segments, trace MR. Autoimmune workup negative. Respiratory panel positive for Coronavirus OC43. Blood cultures negative so far. Procal negative. Currently on 2L NC. Wean as tolerated. Will be discharged with 2L NC.      2. COPD: Pulmonary consulted. Continue Duonebs. Given IV solumedrol. Continue Prednisone daily. Start Anoro and given Prednisone taper on discharge. Follow up in 1 month for CT, if no improvement will need biopsy.       3. Leukocytosis: Secondary to steroids.      4. Hypertension: Continue Cozaar.      5. Nicotine dependence: Continue Nicotine patch and lozenge. Patient is interested in quitting smoking. Given script for nicotine patch. Patient is hemodynamically stable and ready for discharge with [facility]. He is to follow up with PCP, Pulmonary and ID. Discharge Exam:  General Appearance: alert and oriented to person, place and time and in no acute distress  Skin: warm and dry  Head: normocephalic and atraumatic  Eyes: pupils equal, round, and reactive to light, extraocular eye movements intact, conjunctivae normal  Neck: neck supple and non tender without mass   Pulmonary/Chest: clear to auscultation bilaterally- no wheezes, rales or rhonchi, normal air movement, no respiratory distress  Cardiovascular: normal rate, normal S1 and S2 and no carotid bruits  Abdomen: soft, non-tender, non-distended, normal bowel sounds, no masses or organomegaly  Extremities: no cyanosis, no clubbing and no edema  Neurologic: no cranial nerve deficit and speech normal    No intake/output data recorded. No intake/output data recorded.       LABS:  Recent Labs     02/08/22  0428 02/09/22  0320 02/10/22  0413    141 140   K 4.2 4.2 3.8    101 100   CO2 30* 31* 28   BUN 18 22* 20   CREATININE 0.9 0.9 0.9   GLUCOSE 179* 169* 129*   CALCIUM 9.2 9.2 8.9       Recent Labs     02/08/22  0428 02/09/22  0320 02/10/22  0413   WBC 9.4 15.9* 13.4*   RBC 4.58 4.33 4.30   HGB 14.2 13.2 13.3   HCT 43.3 41.2 40.2   MCV 94.5 95.2 93.5   MCH 31.0 30.5 30.9   MCHC 32.8 32.0 33.1   RDW 12.8 13.0 13.1    265 288   MPV 8.6 8.8 8.7       No results for input(s): POCGLU in the last 72 hours. Imaging:  XR CHEST PORTABLE    Result Date: 2/7/2022  EXAMINATION: ONE XRAY VIEW OF THE CHEST 2/7/2022 11:51 am COMPARISON: Chest, single view 08/17/2013 HISTORY: ORDERING SYSTEM PROVIDED HISTORY: dyspnea TECHNOLOGIST PROVIDED HISTORY: Reason for exam:->dyspnea FINDINGS: AP view of the chest was obtained on 2 images. Heart, mediastinum, and pulmonary vasculature are within normal limits. Lungs and pleural spaces are clear. No active cardiopulmonary disease. CTA PULMONARY W CONTRAST    Result Date: 2/7/2022  EXAMINATION: CTA OF THE CHEST 2/7/2022 4:19 pm TECHNIQUE: CTA of the chest was performed after the administration of intravenous contrast.  Multiplanar reformatted images are provided for review. MIP images are provided for review. Dose modulation, iterative reconstruction, and/or weight based adjustment of the mA/kV was utilized to reduce the radiation dose to as low as reasonably achievable. COMPARISON: None. HISTORY: ORDERING SYSTEM PROVIDED HISTORY: rule out PE TECHNOLOGIST PROVIDED HISTORY: Reason for exam:->rule out PE Decision Support Exception - unselect if not a suspected or confirmed emergency medical condition->Emergency Medical Condition (MA) FINDINGS: There is adequate opacification of the pulmonary arteries. There is no evidence of filling defect to suggest pulmonary embolism. There is no evidence of thoracic aortic aneurysm or dissection. There is no evidence of pleural or pericardial effusion. There are calcifications within the coronary arteries.   There is no evidence of lymphadenopathy within the thorax. The central airways are patent. There is no pneumothorax. There are several cavitary nodules. A right upper lobe nodule with central cavitation on image number 32 measures approximately 10 mm in size. There is a nodular opacity with peripheral cavitation in the right upper lobe on image number 37 which measures approximately 13 mm in size. A nodule with central cavitation in the right upper lobe on image number 44 measures approximately 12 mm in size. There is consolidation along the right major fissure within the upper lobe with associated cavitation. A representative nodular focus measures approximately 20 mm in size. There are several small nodules in the right lower lobe without cavitation in somewhat of a tree-in-bud appearance. These nodules measure 4 mm and less in size. There is a left lower lobe nodule without cavitation which measures approximately 8 mm in size on image number 81. There is also left lower lobe nodule measuring approximately 9 mm in size on image number 116. There is a cavitary nodule in the superior segment left lower lobe on image number 67 which measures approximately 7 mm in size. A left upper lobe without cavitation on image number 65 measures approximately 6 mm in size. There are additional scattered small cavitary and non cavitary nodules within the bilateral lungs. There are mild emphysematous changes within the lungs. There is thickening of the adrenal glands which is likely benign in nature. There is no acute abnormality within the upper abdomen. There are degenerative changes within the spine. 1. No evidence of pulmonary embolism. 2. There are multiple cavitary and non cavitary nodules within the bilateral lungs as well as consolidation in the right upper lobe adjacent to the major fissure. This could be secondary to infection.   The differential diagnosis includes autoimmune disease such as granulomatosis with polyangiitis and rheumatoid arthritis. These could represent septic emboli. Primary malignancy and cavitary metastases from such entities as squamous cell carcinoma should also be considered. 3. Emphysema. Patient Instructions:      Medication List      START taking these medications    ampicillin-sulbactam  infusion  Commonly known as: UNASYN  Infuse 3,000 mg intravenously every 6 hours for 21 days Compound per protocol     Anoro Ellipta 62.5-25 MCG/INH Aepb inhaler  Generic drug: umeclidinium-vilanterol  Inhale 1 puff into the lungs daily     nicotine 14 MG/24HR  Commonly known as: NICODERM CQ  Place 1 patch onto the skin daily  Start taking on: February 11, 2022     predniSONE 10 MG tablet  Commonly known as: DELTASONE  Take 4 tablets by mouth daily for 1 day, THEN 3 tablets daily for 1 day, THEN 2 tablets daily for 1 day, THEN 1 tablet daily for 1 day. Start taking on: February 10, 2022        CONTINUE taking these medications    gabapentin 400 MG capsule  Commonly known as: NEURONTIN     ibuprofen 800 MG tablet  Commonly known as: IBU  Take 1 tablet by mouth every 8 hours as needed for Pain Take with food.      losartan 25 MG tablet  Commonly known as: COZAAR           Where to Get Your Medications      These medications were sent to [address] [place] [address], [address] [place] [address] [phone]  [place] [id number]    Phone: [phone]   · Anoro Ellipta 62.5-25 MCG/INH Aepb inhaler  · nicotine 14 MG/24HR  · predniSONE 10 MG tablet     You can get these medications from any pharmacy    Bring a paper prescription for each of these medications  · ampicillin-sulbactam  infusion           Note that more than 30 minutes was spent in preparing discharge papers, discussing discharge with patient, medication review, etc.    Signed:  Electronically signed by Jesus Plaza PA-C on 2/10/2022 at 5:59 PM   HOSPITALIST ATTENDING PHYSICIAN NOTE 2/10/2022 1921PM:    Details of the evaluation - subjective assessment (including medication profile, past medical, family and social history when applicable), examination, review of lab and test data, diagnostic impressions and medical decision making - performed by Obey Cruz PA-C, were discussed with me on the date of service and I agree with clinical information herein unless otherwise noted. The patient has been evaluated by me personally earlier today. Pt reports no fevers, chills, n/v.     Exam: heart reg at rate of 76,lungs cta, abd pos bs soft nt, ext neg for le edema    I agree with the assessment and plan of Jennifer Witt PA-C    Acute respiratory failure with hypoxia  Multifocal bacterial pneumonia with notation of multifocal lung nodules with cavitation  Possible right sided endocarditis  Corona virus oc43 infection  Copd exacerbation  Hyperglycemia likely due to stress/steroids  htn        Total time for discharge is 37 min    Electronically signed by Mackenzie Madrid D.O.   Hospitalist  [facility] at [facility]

## 2022-02-10 NOTE — PLAN OF CARE
Problem: Falls - Risk of:  Goal: Will remain free from falls  Description: Will remain free from falls  2/10/2022 1803 by Aureliano Avila RN  Outcome: Completed  2/10/2022 0951 by Aureliano Avila RN  Outcome: Ongoing  Goal: Absence of physical injury  Description: Absence of physical injury  2/10/2022 1803 by Aureliano Avila RN  Outcome: Completed  2/10/2022 0951 by Aureliano Avila RN  Outcome: Ongoing

## 2022-02-10 NOTE — PROGRESS NOTES
Notified Lynne Brasher at [facility] of pt d/c awaiting delivery of portable tank at this time.

## 2022-02-10 NOTE — PROGRESS NOTES
Pulsox was __89___% on room air at rest.   Ambulated patient on room air. Oxygen saturation was _86___% on room air while ambulating. Oxygen applied. Recovery pulsox was __90__% on _2.5___L of oxygen while ambulating.

## 2022-02-10 NOTE — PROGRESS NOTES
DME orders faxed to Arnaldo Peterson at [facility] and I notified him via telephone that order will be coming through.  Instructed to call him to notify for sure once we have the d/c orders so that they can plan the delivery of home oxygen

## 2022-02-10 NOTE — PROCEDURES
PICC   Catheter insertion date: 2/10/2022     Product Number:  [id number]   Lot No: [id number]   Gauge: 17   Lumen: MBLGBC/8.1 [place]ian   R Basilic    Vein Diameter : 0.72CM   Mid upper arm circumference: 32CM   Catheter Length : 47CM   Internal Length: 47CM   External Catheter Length: 0CM   Ultrasound Used: YES  VPS [facility] [place] confirms PICC tip is placed in the lower 1/3 of the SVC or at the Cavoatrial junction. Floor nurse notified PICC is okay to use.    : Omid Morales RN

## 2022-02-10 NOTE — CARE COORDINATION
Social Work / Discharge Planning : Everardo Narayanan from [facility] called and updated SW that patient qualifies for 100% coverage. ISAÍAS updated patient and spouse. YEAH! SW to follow. Electronically signed by MAHESH Sims on 2/10/22 at 1:29 PM EST    Addendum :ISAÍAS noted [employer] supports oxygen at discharge. ISAÍAS called Jayshree Leal from [place] [address] and ordered his HOME 02. Please call subha at discharge. Will need DME order. Also, call [facility]  to cordinate [phone]. ISAÍAS ton follow.  Electronically signed by MAHESH Sims on 2/10/22 at 2:25 PM EST

## 2022-02-10 NOTE — PROGRESS NOTES
2/10/2022 discharge instructions given, portable o2 tank at bedside. Pt verbalize understanding of scripts to be picked up and home care nurse that will be coming to the house tonight. Iv removed, tele removed.

## 2022-02-12 LAB
BLOOD CULTURE, ROUTINE: NORMAL
CULTURE, BLOOD 2: NORMAL

## 2022-02-28 LAB
ALBUMIN SERPL-MCNC: 3.9 G/DL (ref 3.5–5.2)
ALP BLD-CCNC: 83 U/L (ref 40–129)
ALT SERPL-CCNC: 57 U/L (ref 0–40)
ANION GAP SERPL CALCULATED.3IONS-SCNC: 12 MMOL/L (ref 7–16)
AST SERPL-CCNC: 45 U/L (ref 0–39)
BASOPHILS ABSOLUTE: 0.02 E9/L (ref 0–0.2)
BASOPHILS RELATIVE PERCENT: 0.4 % (ref 0–2)
BILIRUB SERPL-MCNC: <0.2 MG/DL (ref 0–1.2)
BUN BLDV-MCNC: 17 MG/DL (ref 6–20)
C-REACTIVE PROTEIN: 0.3 MG/DL (ref 0–0.4)
CALCIUM SERPL-MCNC: 8.7 MG/DL (ref 8.6–10.2)
CHLORIDE BLD-SCNC: 101 MMOL/L (ref 98–107)
CO2: 26 MMOL/L (ref 22–29)
CREAT SERPL-MCNC: 1 MG/DL (ref 0.7–1.2)
EOSINOPHILS ABSOLUTE: 0.14 E9/L (ref 0.05–0.5)
EOSINOPHILS RELATIVE PERCENT: 2.6 % (ref 0–6)
GFR AFRICAN AMERICAN: >60
GFR NON-AFRICAN AMERICAN: >60 ML/MIN/1.73
GLUCOSE BLD-MCNC: 76 MG/DL (ref 74–99)
HCT VFR BLD CALC: 42.4 % (ref 37–54)
HEMOGLOBIN: 13.9 G/DL (ref 12.5–16.5)
IMMATURE GRANULOCYTES #: 0.02 E9/L
IMMATURE GRANULOCYTES %: 0.4 % (ref 0–5)
LYMPHOCYTES ABSOLUTE: 1.61 E9/L (ref 1.5–4)
LYMPHOCYTES RELATIVE PERCENT: 29.4 % (ref 20–42)
MCH RBC QN AUTO: 30.3 PG (ref 26–35)
MCHC RBC AUTO-ENTMCNC: 32.8 % (ref 32–34.5)
MCV RBC AUTO: 92.6 FL (ref 80–99.9)
MONOCYTES ABSOLUTE: 0.47 E9/L (ref 0.1–0.95)
MONOCYTES RELATIVE PERCENT: 8.6 % (ref 2–12)
NEUTROPHILS ABSOLUTE: 3.21 E9/L (ref 1.8–7.3)
NEUTROPHILS RELATIVE PERCENT: 58.6 % (ref 43–80)
PDW BLD-RTO: 13.7 FL (ref 11.5–15)
PLATELET # BLD: 201 E9/L (ref 130–450)
PMV BLD AUTO: 9 FL (ref 7–12)
POTASSIUM SERPL-SCNC: 4.5 MMOL/L (ref 3.5–5)
RBC # BLD: 4.58 E12/L (ref 3.8–5.8)
SEDIMENTATION RATE, ERYTHROCYTE: 12 MM/HR (ref 0–15)
SODIUM BLD-SCNC: 139 MMOL/L (ref 132–146)
TOTAL PROTEIN: 6.6 G/DL (ref 6.4–8.3)
WBC # BLD: 5.5 E9/L (ref 4.5–11.5)

## 2022-03-17 ENCOUNTER — HOSPITAL ENCOUNTER (OUTPATIENT)
Dept: CT IMAGING | Age: 52
Discharge: HOME | End: 2022-03-17
Payer: COMMERCIAL

## 2022-03-17 DIAGNOSIS — U07.1 PNEUMONIA DUE TO COVID-19 VIRUS: ICD-10-CM

## 2022-03-17 DIAGNOSIS — J12.82 PNEUMONIA DUE TO COVID-19 VIRUS: ICD-10-CM

## 2022-03-17 PROCEDURE — 71250 CT THORAX DX C-: CPT

## 2022-03-22 ENCOUNTER — HOSPITAL ENCOUNTER (OUTPATIENT)
Age: 52
Discharge: HOME | End: 2022-03-22
Payer: COMMERCIAL

## 2022-03-22 DIAGNOSIS — J98.4 CAVITARY LESION OF LUNG: ICD-10-CM

## 2022-03-22 LAB
ALBUMIN SERPL-MCNC: 4.3 G/DL (ref 3.5–5.2)
ALP BLD-CCNC: 82 U/L (ref 40–129)
ALT SERPL-CCNC: 16 U/L (ref 0–40)
ANION GAP SERPL CALCULATED.3IONS-SCNC: 12 MMOL/L (ref 7–16)
AST SERPL-CCNC: 17 U/L (ref 0–39)
BASOPHILS ABSOLUTE: 0.03 E9/L (ref 0–0.2)
BASOPHILS RELATIVE PERCENT: 0.4 % (ref 0–2)
BILIRUB SERPL-MCNC: 0.5 MG/DL (ref 0–1.2)
BUN BLDV-MCNC: 13 MG/DL (ref 6–20)
C-REACTIVE PROTEIN: 2.7 MG/DL (ref 0–0.4)
CALCIUM SERPL-MCNC: 9.3 MG/DL (ref 8.6–10.2)
CHLORIDE BLD-SCNC: 101 MMOL/L (ref 98–107)
CO2: 25 MMOL/L (ref 22–29)
CREAT SERPL-MCNC: 1 MG/DL (ref 0.7–1.2)
EOSINOPHILS ABSOLUTE: 0.08 E9/L (ref 0.05–0.5)
EOSINOPHILS RELATIVE PERCENT: 1 % (ref 0–6)
GFR AFRICAN AMERICAN: >60
GFR NON-AFRICAN AMERICAN: >60 ML/MIN/1.73
GLUCOSE BLD-MCNC: 78 MG/DL (ref 74–99)
HCT VFR BLD CALC: 44.7 % (ref 37–54)
HEMOGLOBIN: 15.1 G/DL (ref 12.5–16.5)
IMMATURE GRANULOCYTES #: 0.03 E9/L
IMMATURE GRANULOCYTES %: 0.4 % (ref 0–5)
LYMPHOCYTES ABSOLUTE: 1.71 E9/L (ref 1.5–4)
LYMPHOCYTES RELATIVE PERCENT: 20.3 % (ref 20–42)
MCH RBC QN AUTO: 30.7 PG (ref 26–35)
MCHC RBC AUTO-ENTMCNC: 33.8 % (ref 32–34.5)
MCV RBC AUTO: 90.9 FL (ref 80–99.9)
MONOCYTES ABSOLUTE: 0.88 E9/L (ref 0.1–0.95)
MONOCYTES RELATIVE PERCENT: 10.5 % (ref 2–12)
NEUTROPHILS ABSOLUTE: 5.68 E9/L (ref 1.8–7.3)
NEUTROPHILS RELATIVE PERCENT: 67.4 % (ref 43–80)
PDW BLD-RTO: 13.9 FL (ref 11.5–15)
PLATELET # BLD: 210 E9/L (ref 130–450)
PMV BLD AUTO: 8.4 FL (ref 7–12)
POTASSIUM SERPL-SCNC: 4.1 MMOL/L (ref 3.5–5)
RBC # BLD: 4.92 E12/L (ref 3.8–5.8)
SEDIMENTATION RATE, ERYTHROCYTE: 17 MM/HR (ref 0–15)
SODIUM BLD-SCNC: 138 MMOL/L (ref 132–146)
TOTAL PROTEIN: 7.3 G/DL (ref 6.4–8.3)
WBC # BLD: 8.4 E9/L (ref 4.5–11.5)

## 2022-03-22 PROCEDURE — 86140 C-REACTIVE PROTEIN: CPT

## 2022-03-22 PROCEDURE — 85025 COMPLETE CBC W/AUTO DIFF WBC: CPT

## 2022-03-22 PROCEDURE — 36415 COLL VENOUS BLD VENIPUNCTURE: CPT

## 2022-03-22 PROCEDURE — 85651 RBC SED RATE NONAUTOMATED: CPT

## 2022-03-22 PROCEDURE — 80053 COMPREHEN METABOLIC PANEL: CPT

## 2023-05-12 LAB
ANION GAP IN SER/PLAS: 12 MMOL/L (ref 10–20)
BASOPHILS (10*3/UL) IN BLOOD BY AUTOMATED COUNT: 0.05 X10E9/L (ref 0–0.1)
BASOPHILS/100 LEUKOCYTES IN BLOOD BY AUTOMATED COUNT: 0.6 % (ref 0–2)
CALCIUM (MG/DL) IN SER/PLAS: 8.9 MG/DL (ref 8.6–10.3)
CARBON DIOXIDE, TOTAL (MMOL/L) IN SER/PLAS: 28 MMOL/L (ref 21–32)
CHLORIDE (MMOL/L) IN SER/PLAS: 103 MMOL/L (ref 98–107)
CREATININE (MG/DL) IN SER/PLAS: 0.82 MG/DL (ref 0.5–1.3)
EOSINOPHILS (10*3/UL) IN BLOOD BY AUTOMATED COUNT: 0.81 X10E9/L (ref 0–0.7)
EOSINOPHILS/100 LEUKOCYTES IN BLOOD BY AUTOMATED COUNT: 9.3 % (ref 0–6)
ERYTHROCYTE DISTRIBUTION WIDTH (RATIO) BY AUTOMATED COUNT: 14 % (ref 11.5–14.5)
ERYTHROCYTE MEAN CORPUSCULAR HEMOGLOBIN CONCENTRATION (G/DL) BY AUTOMATED: 32.2 G/DL (ref 32–36)
ERYTHROCYTE MEAN CORPUSCULAR VOLUME (FL) BY AUTOMATED COUNT: 95 FL (ref 80–100)
ERYTHROCYTES (10*6/UL) IN BLOOD BY AUTOMATED COUNT: 3.88 X10E12/L (ref 4.5–5.9)
GFR MALE: >90 ML/MIN/1.73M2
GLUCOSE (MG/DL) IN SER/PLAS: 123 MG/DL (ref 74–99)
HEMATOCRIT (%) IN BLOOD BY AUTOMATED COUNT: 36.9 % (ref 41–52)
HEMOGLOBIN (G/DL) IN BLOOD: 11.9 G/DL (ref 13.5–17.5)
IMMATURE GRANULOCYTES/100 LEUKOCYTES IN BLOOD BY AUTOMATED COUNT: 0.3 % (ref 0–0.9)
LEUKOCYTES (10*3/UL) IN BLOOD BY AUTOMATED COUNT: 8.8 X10E9/L (ref 4.4–11.3)
LYMPHOCYTES (10*3/UL) IN BLOOD BY AUTOMATED COUNT: 1.86 X10E9/L (ref 1.2–4.8)
LYMPHOCYTES/100 LEUKOCYTES IN BLOOD BY AUTOMATED COUNT: 21.3 % (ref 13–44)
MAGNESIUM (MG/DL) IN SER/PLAS: 1.98 MG/DL (ref 1.6–2.4)
MONOCYTES (10*3/UL) IN BLOOD BY AUTOMATED COUNT: 0.76 X10E9/L (ref 0.1–1)
MONOCYTES/100 LEUKOCYTES IN BLOOD BY AUTOMATED COUNT: 8.7 % (ref 2–10)
NEUTROPHILS (10*3/UL) IN BLOOD BY AUTOMATED COUNT: 5.24 X10E9/L (ref 1.2–7.7)
NEUTROPHILS/100 LEUKOCYTES IN BLOOD BY AUTOMATED COUNT: 59.8 % (ref 40–80)
PLATELETS (10*3/UL) IN BLOOD AUTOMATED COUNT: 315 X10E9/L (ref 150–450)
POTASSIUM (MMOL/L) IN SER/PLAS: 4.1 MMOL/L (ref 3.5–5.3)
SODIUM (MMOL/L) IN SER/PLAS: 139 MMOL/L (ref 136–145)
UREA NITROGEN (MG/DL) IN SER/PLAS: 12 MG/DL (ref 6–23)

## 2023-08-05 LAB
ALANINE AMINOTRANSFERASE (SGPT) (U/L) IN SER/PLAS: 9 U/L (ref 10–52)
ALBUMIN (G/DL) IN SER/PLAS: 4.2 G/DL (ref 3.4–5)
ALKALINE PHOSPHATASE (U/L) IN SER/PLAS: 87 U/L (ref 33–120)
ANION GAP IN SER/PLAS: 10 MMOL/L (ref 10–20)
ASPARTATE AMINOTRANSFERASE (SGOT) (U/L) IN SER/PLAS: 12 U/L (ref 9–39)
BILIRUBIN DIRECT (MG/DL) IN SER/PLAS: 0.1 MG/DL (ref 0–0.3)
BILIRUBIN TOTAL (MG/DL) IN SER/PLAS: 0.4 MG/DL (ref 0–1.2)
CALCIUM (MG/DL) IN SER/PLAS: 9.4 MG/DL (ref 8.6–10.3)
CARBON DIOXIDE, TOTAL (MMOL/L) IN SER/PLAS: 29 MMOL/L (ref 21–32)
CHLORIDE (MMOL/L) IN SER/PLAS: 104 MMOL/L (ref 98–107)
CHOLESTEROL (MG/DL) IN SER/PLAS: 149 MG/DL (ref 0–199)
CHOLESTEROL IN HDL (MG/DL) IN SER/PLAS: 39 MG/DL
CHOLESTEROL/HDL RATIO: 3.8
CREATININE (MG/DL) IN SER/PLAS: 0.94 MG/DL (ref 0.5–1.3)
ERYTHROCYTE DISTRIBUTION WIDTH (RATIO) BY AUTOMATED COUNT: 12.9 % (ref 11.5–14.5)
ERYTHROCYTE MEAN CORPUSCULAR HEMOGLOBIN CONCENTRATION (G/DL) BY AUTOMATED: 32.7 G/DL (ref 32–36)
ERYTHROCYTE MEAN CORPUSCULAR VOLUME (FL) BY AUTOMATED COUNT: 90 FL (ref 80–100)
ERYTHROCYTES (10*6/UL) IN BLOOD BY AUTOMATED COUNT: 5.03 X10E12/L (ref 4.5–5.9)
GFR MALE: >90 ML/MIN/1.73M2
GLUCOSE (MG/DL) IN SER/PLAS: 100 MG/DL (ref 74–99)
HEMATOCRIT (%) IN BLOOD BY AUTOMATED COUNT: 45.2 % (ref 41–52)
HEMOGLOBIN (G/DL) IN BLOOD: 14.8 G/DL (ref 13.5–17.5)
LDL: 79 MG/DL (ref 0–99)
LEUKOCYTES (10*3/UL) IN BLOOD BY AUTOMATED COUNT: 7.6 X10E9/L (ref 4.4–11.3)
MAGNESIUM (MG/DL) IN SER/PLAS: 2.1 MG/DL (ref 1.6–2.4)
NATRIURETIC PEPTIDE B (PG/ML) IN SER/PLAS: 33 PG/ML (ref 0–99)
PLATELETS (10*3/UL) IN BLOOD AUTOMATED COUNT: 244 X10E9/L (ref 150–450)
POTASSIUM (MMOL/L) IN SER/PLAS: 4.4 MMOL/L (ref 3.5–5.3)
PROTEIN TOTAL: 6.9 G/DL (ref 6.4–8.2)
SODIUM (MMOL/L) IN SER/PLAS: 139 MMOL/L (ref 136–145)
TRIGLYCERIDE (MG/DL) IN SER/PLAS: 153 MG/DL (ref 0–149)
UREA NITROGEN (MG/DL) IN SER/PLAS: 15 MG/DL (ref 6–23)
VLDL: 31 MG/DL (ref 0–40)

## 2023-11-14 DIAGNOSIS — E78.5 HYPERLIPIDEMIA, UNSPECIFIED HYPERLIPIDEMIA TYPE: Primary | ICD-10-CM

## 2023-11-14 RX ORDER — ATORVASTATIN CALCIUM 80 MG/1
80 TABLET, FILM COATED ORAL DAILY
Qty: 90 TABLET | Refills: 3 | Status: SHIPPED | OUTPATIENT
Start: 2023-11-14

## 2024-01-14 DIAGNOSIS — E78.5 HYPERLIPIDEMIA, UNSPECIFIED HYPERLIPIDEMIA TYPE: ICD-10-CM

## 2024-01-14 DIAGNOSIS — I25.10 CORONARY ARTERY DISEASE INVOLVING NATIVE CORONARY ARTERY OF NATIVE HEART, UNSPECIFIED WHETHER ANGINA PRESENT: Primary | ICD-10-CM

## 2024-01-22 RX ORDER — EZETIMIBE 10 MG/1
10 TABLET ORAL DAILY
Qty: 90 TABLET | Refills: 0 | Status: SHIPPED | OUTPATIENT
Start: 2024-01-22 | End: 2024-04-22 | Stop reason: SDUPTHER

## 2024-01-23 DIAGNOSIS — I10 HYPERTENSION, UNSPECIFIED TYPE: Primary | ICD-10-CM

## 2024-01-23 DIAGNOSIS — E78.5 HYPERLIPIDEMIA, UNSPECIFIED HYPERLIPIDEMIA TYPE: ICD-10-CM

## 2024-01-23 DIAGNOSIS — E78.2 MIXED HYPERLIPIDEMIA: ICD-10-CM

## 2024-01-23 DIAGNOSIS — I50.42 CHRONIC COMBINED SYSTOLIC AND DIASTOLIC CONGESTIVE HEART FAILURE (MULTI): ICD-10-CM

## 2024-01-23 DIAGNOSIS — I25.10 CORONARY ARTERY DISEASE INVOLVING NATIVE CORONARY ARTERY OF NATIVE HEART, UNSPECIFIED WHETHER ANGINA PRESENT: ICD-10-CM

## 2024-01-23 RX ORDER — EZETIMIBE 10 MG/1
10 TABLET ORAL DAILY
Qty: 90 TABLET | Refills: 3 | OUTPATIENT
Start: 2024-01-23

## 2024-01-27 DIAGNOSIS — I50.42 CHRONIC COMBINED SYSTOLIC AND DIASTOLIC CONGESTIVE HEART FAILURE (MULTI): Primary | ICD-10-CM

## 2024-01-27 DIAGNOSIS — I25.10 CORONARY ARTERY DISEASE INVOLVING NATIVE CORONARY ARTERY OF NATIVE HEART, UNSPECIFIED WHETHER ANGINA PRESENT: ICD-10-CM

## 2024-01-29 RX ORDER — ASPIRIN 81 MG/1
81 TABLET ORAL DAILY
Qty: 90 TABLET | Refills: 3 | Status: SHIPPED | OUTPATIENT
Start: 2024-01-29

## 2024-01-29 RX ORDER — SACUBITRIL AND VALSARTAN 24; 26 MG/1; MG/1
1 TABLET, FILM COATED ORAL 2 TIMES DAILY
Qty: 180 TABLET | Refills: 1 | Status: SHIPPED | OUTPATIENT
Start: 2024-01-29

## 2024-01-29 RX ORDER — SPIRONOLACTONE 25 MG/1
25 TABLET ORAL DAILY
Qty: 90 TABLET | Refills: 1 | Status: SHIPPED | OUTPATIENT
Start: 2024-01-29

## 2024-02-02 RX ORDER — ALBUTEROL SULFATE 90 UG/1
AEROSOL, METERED RESPIRATORY (INHALATION)
COMMUNITY
End: 2024-02-23 | Stop reason: SDUPTHER

## 2024-02-02 RX ORDER — CARVEDILOL 6.25 MG/1
6.25 TABLET ORAL
COMMUNITY
Start: 2024-01-14

## 2024-02-02 RX ORDER — CLOPIDOGREL BISULFATE 75 MG/1
75 TABLET ORAL DAILY
COMMUNITY
Start: 2023-06-13

## 2024-02-03 PROBLEM — Z87.891 HISTORY OF TOBACCO USE: Status: ACTIVE | Noted: 2024-02-03

## 2024-02-03 PROBLEM — I10 ESSENTIAL HYPERTENSION, BENIGN: Status: ACTIVE | Noted: 2024-02-03

## 2024-02-03 PROBLEM — I25.10 CORONARY ARTERY DISEASE INVOLVING NATIVE CORONARY ARTERY OF NATIVE HEART: Status: ACTIVE | Noted: 2024-02-03

## 2024-02-03 PROBLEM — E78.5 DYSLIPIDEMIA: Status: ACTIVE | Noted: 2024-02-03

## 2024-02-03 PROBLEM — J44.9 COPD (CHRONIC OBSTRUCTIVE PULMONARY DISEASE) (MULTI): Status: ACTIVE | Noted: 2024-02-03

## 2024-02-03 PROBLEM — I50.42 CHRONIC COMBINED SYSTOLIC AND DIASTOLIC HEART FAILURE (MULTI): Status: ACTIVE | Noted: 2024-02-03

## 2024-02-03 NOTE — PROGRESS NOTES
[facility] Vascular Cardiology    Patient Name: Simón Rajput  Patient : 1970      Scribe Attestation  By signing my name below, IValerie Scribe   attest that this documentation has been prepared under the direction and in the presence of Govind Watts DO.      Reason for visit:  This is a 53-year-old male here for follow-up regarding chronic systolic and diastolic heart failure with an ejection fraction 45%, coronary artery disease status post bypass done in 2023, hypertension, dyslipidemia, and COPD/tobacco use.    HPI:  This is a 53-year-old male here for follow-up regarding chronic systolic and diastolic heart failure with an ejection fraction 45%, coronary artery disease status post bypass done in 2023, hypertension, dyslipidemia, and COPD/tobacco use.  The patient was last evaluated by me in 2023.  At that visit I started him on Zetia 10 mg daily, ordered blood work including CMP/lipid/magnesium/CBC/BNP to be drawn in 6 months, refer the patient to food for life, and asked that he follow-up in 6 months and sooner if necessary. ECG done today showed sinus rhythm with a heart rate of 80 bpm, and PVC.  The patient reports that he has been feeling generally well from the cardiac standpoint. He denies any new chest pain, shortness of breath, palpitations and lightheadedness. He states that he takes all of his medications as prescribed. During my exam, he was resting comfortably on the exam table.            Assessment/Plan:   1. Chronic systolic and diastolic heart failure   The patient has a history of chronic systolic and diastolic heart failure with an ejection fraction of 45%.  Echocardiogram done on 23 mildly decreased left ventricular systolic function with an ejection fraction of 45%. Abnormal septal motion consistent with post-operative status. There is low normal right ventricular systolic function. Poorly visualized anatomical structures  due to suboptimal image quality.  He does not appear significantly volume overloaded on exam today.  He should continue his current cardiac medications.  Lab works were ordered as noted below.   I discussed with him the importance of following a low-sodium heart healthy diet.   Follow up in 6 months and sooner if necessary.      2. Coronary artery disease  The patient has a history coronary artery disease status post bypass done in April 2023.  ECG done today showed sinus rhythm with a heart rate of 80 bpm, and PVC.    He denies anginal chest discomfort.  Blood pressure appears controlled on exam today.  He should continue current antihypertensive medications and antiplatelet therapy.  Echocardiogram done on 4/26/23 mildly decreased left ventricular systolic function with an ejection fraction of 45%. Abnormal septal motion consistent with post-operative status. There is low normal right ventricular systolic function. Poorly visualized anatomical structures due to suboptimal image quality.  Lipid panel done in August 2023 showed an LDL of 79 and triglycerides of 153 while on atorvastatin 80 mg daily.   He is currently on atorvastatin 80 mg daily and  Zetia 10 mg daily.   Lab works were ordered as noted below.   Please see lifestyle recommendations below.  Follow up in 6 months and sooner if necessary.      3. Hypertension  Patient has a history of hypertension which appears controlled on exam today.  He should continue his current antihypertensive medications.      4. Dyslipidemia  Lipid panel done in August 2023 showed an LDL of 79 and triglycerides of 153 while on atorvastatin 80 mg daily.   He is currently on atorvastatin 80 mg daily and  Zetia 10 mg daily.   Lipid panel was ordered as noted below.   Please see lifestyle recommendations below.     5. COPD/History of tobacco use  The patient is a former smoker.  I discussed with him the importance of continued smoking cessation.  I will refer him to Pulmonology for  management of COPD.      Orders:   Refer to Pulmonology  CMP/lipid/magnesium/CBC/BNP   Follow-up in 6 months.    Lifestyle Recommendations  I recommend a whole-food plant-based diet, an eating pattern that encourages the consumption of unrefined plant foods (such as fruits, vegetables, tubers, whole grains, legumes, nuts and seeds) and discourages meats, dairy products, eggs and processed foods.     The AHA/ACC recommends that the patient consume a dietary pattern that emphasizes intake of vegetables, fruits, and whole grains; includes low-fat dairy products, poultry, fish, legumes, non-tropical vegetable oils, and nuts; and limits intake of sodium, sweets, sugar-sweetened beverages, and red meats.  Adapt this dietary pattern to appropriate calorie requirements (a 500-750 kcal/day deficit to loose weight), personal and cultural food preferences, and nutrition therapy for other medical conditions (including diabetes).  Achieve this pattern by following plans such as the [place] Mediterranean, DASH dietary pattern, or AHA diet.     Engage in 2 hours and 30 minutes per week of moderate-intensity physical activity, or 1 hour and 15 minutes (75 minutes) per week of vigorous-intensity aerobic physical activity, or an equivalent combination of moderate and vigorous-intensity aerobic physical activity. Aerobic activity should be performed in episodes of at least 10 minutes preferably spread throughout the week.     Adhering to a heart healthy diet, regular exercise habits, avoidance of tobacco products, and maintenance of a healthy weight are crucial components of their heart disease risk reduction.     Any positive review of systems not specifically addressed in the office visit today should be evaluated and treated by the patients primary care physician or in an emergency department if necessary     Patient was notified that results from ordered tests will be called to the patient if it changes current management; it will  "otherwise be discussed at a future appointment and available on  [facility].     Thank you for allowing me to participate in the care of this patient.        This document was generated using the assistance of voice recognition software. If there are any errors of spelling, grammar, syntax, or meaning; please feel free to contact me directly for clarification.    Past Medical History:  He has no past medical history on file.    Past Surgical History:  He has no past surgical history on file.      Social History:  He has no history on file for tobacco use, alcohol use, and drug use.    Family History:  No family history on file.     Allergies:  Patient has no allergy information on record.    Outpatient Medications:  Current Outpatient Medications   Medication Instructions    albuterol 90 mcg/actuation inhaler INHALE 1 TO 2 PUFFS INTO LUNGS EVERY 4 HOURS AS NEEDED FOR SHORTNESS OF BREATH AND WHEEZING    aspirin 81 mg, oral, Daily    atorvastatin (LIPITOR) 80 mg, oral, Daily    carvedilol (COREG) 6.25 mg, oral, 2 times daily with meals    clopidogrel (PLAVIX) 75 mg, oral, Daily    Entresto 24-26 mg tablet 1 tablet, oral, 2 times daily    ezetimibe (ZETIA) 10 mg, oral, Daily    spironolactone (ALDACTONE) 25 mg, oral, Daily        ROS:  A 14 point review of systems was done and is negative other than as stated in HPI    Vitals:      1/25/2023     3:24 PM 2/10/2023     9:25 AM 5/3/2023     1:28 PM 8/10/2023    12:27 PM   Vitals   Systolic 120 112 112 120   Diastolic 86 77 68 84   Heart Rate 97 86 95 102   Height (in) 1.854 m (6' 1\") 1.854 m (6' 1\") 1.854 m (6' 1\") 1.854 m (6' 1\")   Weight (lb) 191 186 186 189   BMI 25.2 kg/m2 24.54 kg/m2 24.54 kg/m2 24.94 kg/m2   BSA (m2) 2.11 m2 2.08 m2 2.08 m2 2.1 m2        Physical Exam:   Constitutional: Cooperative, in no acute distress, alert, appears stated age.   Skin: Skin color, texture, turgor normal. No rashes or lesions.   Head: Normocephalic. No masses, lesions, tenderness " or abnormalities   Eyes: Extraocular movements are grossly intact.   Mouth and throat: Mucous membranes moist   Neck: Neck supple, no carotid bruits, no JVD   Respiratory: Lungs clear to auscultation, no wheezing or rhonchi, no use of accessory muscles   Chest wall: Sternal scar, normal excursion with respiration   Cardiovascular: Regular rhythm, no murmur  Gastrointestinal: Abdomen soft, nontender. Bowel sounds normal.   Musculoskeletal: Strength equal in upper extremities   Extremities: No pitting edema   Neurologic: Sensation grossly intact, alert and oriented x3       Intake/Output:   No intake/output data recorded.    Outpatient Medications  Current Outpatient Medications on File Prior to Visit   Medication Sig Dispense Refill    albuterol 90 mcg/actuation inhaler INHALE 1 TO 2 PUFFS INTO LUNGS EVERY 4 HOURS AS NEEDED FOR SHORTNESS OF BREATH AND WHEEZING      aspirin 81 mg EC tablet Take 1 tablet by mouth once daily 90 tablet 3    atorvastatin (Lipitor) 80 mg tablet Take 1 tablet (80 mg) by mouth once daily. 90 tablet 3    carvedilol (Coreg) 6.25 mg tablet Take 1 tablet (6.25 mg) by mouth 2 times a day with meals.      clopidogrel (Plavix) 75 mg tablet Take 1 tablet (75 mg) by mouth once daily.      Entresto 24-26 mg tablet Take 1 tablet by mouth twice daily 180 tablet 1    ezetimibe (Zetia) 10 mg tablet Take 1 tablet by mouth once daily 90 tablet 0    spironolactone (Aldactone) 25 mg tablet Take 1 tablet by mouth once daily 90 tablet 1     No current facility-administered medications on file prior to visit.       Labs: (past 26 weeks)  Recent Results (from the past 4368 hour(s))   Bilirubin, Direct    Collection Time: 08/05/23  9:00 AM   Result Value Ref Range    Bilirubin, Direct 0.1 0.0 - 0.3 mg/dL   Magnesium    Collection Time: 08/05/23  9:00 AM   Result Value Ref Range    Magnesium 2.10 1.60 - 2.40 mg/dL   B-Type Natriuretic Peptide    Collection Time: 08/05/23  9:00 AM   Result Value Ref Range    BNP 33  0 - 99 pg/mL   Lipid Panel    Collection Time: 08/05/23  9:00 AM   Result Value Ref Range    Cholesterol 149 0 - 199 mg/dL    HDL 39.0 (A) mg/dL    Cholesterol/HDL Ratio 3.8     LDL 79 0 - 99 mg/dL    VLDL 31 0 - 40 mg/dL    Triglycerides 153 (H) 0 - 149 mg/dL   CBC    Collection Time: 08/05/23  9:00 AM   Result Value Ref Range    WBC 7.6 4.4 - 11.3 x10E9/L    RBC 5.03 4.50 - 5.90 x10E12/L    Hemoglobin 14.8 13.5 - 17.5 g/dL    Hematocrit 45.2 41.0 - 52.0 %    MCV 90 80 - 100 fL    MCHC 32.7 32.0 - 36.0 g/dL    Platelets 244 150 - 450 x10E9/L    RDW 12.9 11.5 - 14.5 %   Comprehensive Metabolic Panel    Collection Time: 08/05/23  9:00 AM   Result Value Ref Range    Glucose 100 (H) 74 - 99 mg/dL    Sodium 139 136 - 145 mmol/L    Potassium 4.4 3.5 - 5.3 mmol/L    Chloride 104 98 - 107 mmol/L    Bicarbonate 29 21 - 32 mmol/L    Anion Gap 10 10 - 20 mmol/L    Urea Nitrogen 15 6 - 23 mg/dL    Creatinine 0.94 0.50 - 1.30 mg/dL    GFR MALE >90 >90 mL/min/1.73m2    Calcium 9.4 8.6 - 10.3 mg/dL    Albumin 4.2 3.4 - 5.0 g/dL    Alkaline Phosphatase 87 33 - 120 U/L    Total Protein 6.9 6.4 - 8.2 g/dL    AST 12 9 - 39 U/L    Total Bilirubin 0.4 0.0 - 1.2 mg/dL    ALT (SGPT) 9 (L) 10 - 52 U/L       ECG  No results found for this or any previous visit (from the past 4464 hour(s)).    Echocardiogram  No results found for this or any previous visit from the past 1095 days.      CV Studies:  EKG: No results found for this or any previous visit (from the past 4464 hour(s)).  Echocardiogram:   Echocardiogram     [facility], [address] [place] [address]  Tel [phone] and Fax [phone]    TRANSTHORACIC ECHOCARDIOGRAM REPORT      Patient Name:     RALPH Colon Physician:  [id number] Ventura Huerta MD  Study Date:       4/26/2023          Referring           ALCIRA FUENTES  Physician:  GARFIELD/PID:          [id number]           PCP:  Accession/Order#: [id number]          Department           [facility]  Location:           Invasive  YOB: 1970          Fellow:  Gender:           M                  Nurse:              Felicita Hodgson RN  Admit Date:       4/17/2023          Sonographer:        Lloyd Abbott  Cardiac Sonographer  Intern  Admission Status: Inpatient -        Additional Staff:   Tabatha Mix [facility]  Routine  Height:           182.88 cm          CC Report to:       [facility] T[facility]  Weight:           80.29 kg           Study Type:         Echocardiogram  BSA:              2.02 m2  Blood Pressure: 119 /65 mmHg    Diagnosis/ICD: Z95.1-Presence of aortocoronary bypass graft  Indication:    postop CABG  Procedure/CPT: Echo Complete w Full Doppler-[id number]    Patient History:  Pertinent History: Dyspnea. PMH, HTN, HLD, Smoker, COPD, HF (35-40%), s/p CABG  x3 (4/17/23).    Study Detail: The following Echo studies were performed: M-Mode, 2D, Doppler and  color flow. Technically challenging study due to poor acoustic  windows, prominent lung artifact and patient lying in supine  position. Definity used as a contrast agent for endocardial border  definition. Total contrast used for this procedure was 1.0 mL via  IV push.      PHYSICIAN INTERPRETATION:  Left Ventricle: The left ventricular systolic function is mildly decreased, with an estimated ejection fraction of 45%. The left ventricular cavity size is mildly dilated. Abnormal (paradoxical) septal motion consistent with post-operative status. Left ventricular diastolic filling was indeterminate.  Left Atrium: The left atrium is upper limits of normal in size.  Right Ventricle: The right ventricle is normal in size. There is low normal right ventricular systolic function. RV not well visualized.  Right Atrium: The right atrium is upper limits of normal in size.  Aortic Valve: The aortic valve is probably trileaflet. There is trivial aortic valve regurgitation. The peak instantaneous gradient of the aortic valve is  4.2 mmHg.  Mitral Valve: The mitral valve is normal in structure. There is mild mitral annular calcification. There is trace mitral valve regurgitation.  Tricuspid Valve: The tricuspid valve is structurally normal. There is trace tricuspid regurgitation.  Pulmonic Valve: The pulmonic valve is not well visualized. There is trace pulmonic valve regurgitation.  Pericardium: There is a trivial pericardial effusion.  Aorta: The aortic root was not well visualized. There is no dilatation of the aortic root.  Systemic Veins: The inferior vena cava appears to be of normal size. There is IVC inspiratory collapse greater than 50%.      CONCLUSIONS:  1. Poorly visualized anatomical structures due to suboptimal image quality.  2. Left ventricular systolic function is mildly decreased with a 45% estimated ejection fraction.  3. Abnormal septal motion consistent with post-operative status.  4. There is low normal right ventricular systolic function.    QUANTITATIVE DATA SUMMARY:  2D MEASUREMENTS:  Normal Ranges:  Ao Root d:     3.60 cm    (2.0-3.7cm)  LAs:           3.50 cm    (2.7-4.0cm)  IVSd:          1.00 cm    (0.6-1.1cm)  LVPWd:         1.00 cm    (0.6-1.1cm)  LVIDd:         5.70 cm    (3.9-5.9cm)  LVIDs:         4.20 cm  LV Mass Index: 111.9 g/m2  LV % FS        26.3 %    AORTA MEASUREMENTS:  Normal Ranges:  Ao Sinus, d: 3.50 cm (2.1-3.5cm)    LV DIASTOLIC FUNCTION:  Normal Ranges:  MV Peak E:    0.56 m/s    (0.7-1.2 m/s)  MV Peak A:    0.53 m/s    (0.42-0.7 m/s)  E/A Ratio:    1.06        (1.0-2.2)  MV e'         0.08 m/s    (>8.0)  MV lateral e' 0.08 m/s  MV medial e'  0.09 m/s  MV A Dur:     127.00 msec  E/e' Ratio:   6.62        (<8.0)  MV DT:        222 msec    (150-240 msec)    MITRAL VALVE:  Normal Ranges:  MV DT: 222 msec (150-240msec)    AORTIC VALVE:  Normal Ranges:  AoV Vmax:      1.02 m/s (<=1.7m/s)  AoV Peak P.2 mmHg (<20mmHg)  LVOT Max Elvis:  0.82 m/s (<=1.1m/s)  LVOT VTI:      15.10 cm  LVOT Diameter:  2.10 cm  (1.8-2.4cm)  AoV Area,Vmax: 2.80 cm2 (2.5-4.5cm2)    RIGHT VENTRICLE:  RV s' 0.08 m/s    TRICUSPID VALVE/RVSP:  Normal Ranges:  IVC Diam: 1.40 cm    PULMONIC VALVE:  Normal Ranges:  PV Accel Time: 107 msec (>120ms)  PV Max Elvis:    1.0 m/s  (0.6-0.9m/s)  PV Max P.2 mmHg      [id number] Ventura Huerta MD  Electronically signed on 2023 at 11:09:16 AM         Final     Stress Testing IMGRESULT(OFP7113:1:1825): No results found for this or any previous visit from the past 5 days.    Cardiac Catheterization:   Adult Cath     [facility], Cath Lab  [address] [place] [address]  Phone [phone] Fax [phone]    Cardiovascular Catheterization Report    Patient Name:     RALPH REECE Performing Physician: Sherry Love MD  Study Date:       2023      Verifying Physician:  Sherry Love MD  MRN/PID:          [id number]       Cardiologist:  Accession/Order#: [id number]      Referring Physician:  JIMMY FATIMA  YOB: 1970      Referring Physician:  Gender:           M              Referring Physician:      Study: Left Heart Catheterization      Indications:  RALPH REECE is a 53 year old male who presents with dyslipidemia, hypertension, chronic pulmonary disease and tobacco Use - current. Left ventricular dysfunction and suspected coronary artery disease, with an asymptomatic chest pain assessment. Study performed as an urgent cath procedure.    Medical History:  Stress test performed: No. CTA performed: No. [place]atston accessed: No. LVEF Assessed: Yes. LVEF = 35-40%%.    Procedure Description:  After infiltration with 2% Lidocaine, the right radial artery was cannulated with a modified Seldinger technique. Subsequently a 6 Japanese sheath was placed in the right radial artery. Selective coronary catheterization was performed using a 6 Fr catheter(s) exchanged over a guide wire to cannulate the coronary arteries. A David 3.5 tip catheter was  used for left coronary injections. A David 3.5 tip catheter was used for right coronary injections.  Multiple injections of contrast were made into the left and right coronary arteries with angiograms recorded in multiple projections. After completion of the procedure, the arterial sheath was pulled and a TR Band Radial Compression Device was utilized to obtain patent hemostasis.    Coronary Angiography:  The coronary circulation is right dominant.    Left Main Coronary Artery:  The left main coronary artery is a normal caliber vessel. The left main arises normally from the left coronary sinus of Valsalva, bifurcates into the LAD and circumflex coronary arteries and gives rise to the ramus intermedius artery. The left main coronary artery showed mild distal atherosclerotic disease.    Left Anterior Descending Coronary Artery Distribution:  The left anterior descending coronary artery is a normal caliber vessel. The LAD arises normally from the left main coronary artery. The LAD demonstrated chronic occlusion originating at the ostium and There are well-developed collaterals from the RCA to the LAD.    Circumflex Coronary Artery Distribution:  The circumflex coronary artery is a normal caliber vessel. The circumflex arises normally from the left main coronary artery, giving rise to the first obtuse marginal, second obtuse marginal and third obtuse marginal. The circumflex revealed mild mid-segment luminal irregularities. The distal circumflex coronary artery showed 60% stenosis. This lesion was eccentric and focal. The 1st obtuse marginal branch is a normal caliber vessel. The proximal to mid 1st obtuse marginal branch showed 80% stenosis. This lesion was focal.    Ramus Intermedius:  The ramus intermedius is a large caliber vessel. The ramus intermedius arises normally from the left main coronary artery. The ramus intermedius showed mild proximal to mid atherosclerotic disease. The mid ramus intermedius showed 50%  stenosis.    Right Coronary Artery Distribution:    The right coronary artery is a large caliber vessel. The RCA supplies the posterolateral system and supplies the right posterolateral descending artery. The RCA showed mild proximal to mid atherosclerotic disease. The right posterior descending artery is a large caliber vessel. The right posterior descending artery showed mild atherosclerotic disease.    Coronary Lesion Summary:  Vessel       Stenosis   Vessel Segment  Circumflex 60% stenosis     distal  OM 1       80% stenosis proximal to mid  Ramus      50% stenosis       mid      Complications:  No in-lab complications observed.    Cardiac Cath Transition of Care Summary:  Post Procedure           Double vessel disease.  Diagnosis:  Blood Loss:              Estimated blood loss during the procedure was minimal  mls.  Specimens Removed:       Number of specimen(s) removed: none.      Recommendations:  Maximize medical therapy.  CT surgical consultation to consider possible surgical options.  Telemetry monitoring.  Monitor vitals and arterial access site/pulses.  Lipid lowering agent or Statin therapy.  Optimize heart failure medical therapy.  Elective referral to CT surgery for consideration for CABG.  Aspirin therapy.  Beta blocker therapy.  Angiotensin-converting enzyme (ACE) inhibitor for LV systolic dysfunction.    ____________________________________________________________________________________  CONCLUSIONS:  1. Severe multivessel CAD as described.  2. Ostial % , with well-developed right to left collaterals from RCA.  3. LCx distal ~ 60% focal stenosis, OM1 prox-mid focal 80% stenosis.  4. Large RI with mild prox to mid disease, mid ~ 50% stenosis.  5. Large RCA with mild prox-mid dse.  6. No significant LV-AO peak to peak pullback gradient.  7. Left Ventricular end-diastolic pressure = 20.    ____________________________________________________________________________________  CPT Codes:  Left  Heart Cath (visualization of coronaries) and LV-89535; Moderate Sedation Services initial 15 minutes patient >5 years-20616; Moderate Sedation Services 1st additional 15 minutes patient >5 years-92561    ICD 10 Codes:  I50.21-Acute systolic (congestive) heart failure    [id number] Salvatore Love MD  Performing Physician  Electronically signed by [id number] Salvatore Love MD on 1/18/2023 at 5:22:43 PM      cc Report to: JIMMY FATIMA           Final   No results found for this or any previous visit from the past 3650 days.     Cardiac Scoring: No results found for this or any previous visit from the past 1825 days.    AAA : No results found for this or any previous visit from the past 1825 days.    OTHER: No results found for this or any previous visit from the past 1825 days.    LAST IMAGING RESULTS  XR chest 2 view  Narrative: Interpreted By:  STEPHANIE PEREZ MD  MRN: [id number]  Patient Name: RALPH REECE     STUDY:  TH CHEST 2 VIEW PA AND LAT;  6/1/2023 10:44 am     INDICATION:  P/OP  I25.10: CAD (coronary artery disease).     COMPARISON:  04/22/2023     ACCESSION NUMBER(S):  [id number]     ORDERING CLINICIAN:  ELAINE GAINES     TECHNIQUE:     FINDINGS:  Heart is normal in size.     There is no consolidation or pleural.     Sternal wires and mediastinal clips are present.     There are degenerative changes of the spine.     COMPARISON OF FINDING:  Right basilar atelectasis and/or pleural fluid have resolved.     Impression: No acute cardiopulmonary disease.  Intraoperative Transesophageal Echo  [facility] - Dept of Anesthesiology, [address] [place] [address]  Tel [phone] and Fax [phone]    TRANSESOPHAGEAL ECHOCARDIOGRAM REPORT    Patient Name:     RALPH CABRALES SHANELL Reading Physician:   [id number] Roseanne Arreguin MD  Study Date:       4/17/2023        Referring Physician: Elaine Gaines MD  MRN/PID:          [id number]         PCP:  Accession/Order#: [id number]        Department Location: [facility]  Date  of Birth:    1970        Fellow:  Gender:           M                Nurse:  Weight:                            Study Type:          Intraoperative  Transesophageal Echo    Diagnosis/ICD: I25.10-Atherosclerotic heart disease of native coronary artery  without angina pectoris  Indication:  Procedure/CPT: ROSAURA Complete-[id number]    PHYSICIAN INTERPRETATION:  Left Ventricle: The left ventricular systolic function is severely decreased. There are multiple wall motion abnormalities. The left ventricular cavity size is mild to moderately dilated. Left ventricular diastolic filling was not assessed.  Left Atrium: The left atrium is normal in size. There is no evidence of a patent foramen ovale. There is no thrombus visualized in the left atrial appendage.  Right Ventricle: The right ventricle is mildly enlarged. There is moderately reduced right ventricular systolic function.  Right Atrium: The right atrium is normal in size.  Aortic Valve: The aortic valve appears structurally normal. There is no evidence of aortic valve regurgitation.  Mitral Valve: The mitral valve is normal in structure. There is trace mitral valve regurgitation.  Tricuspid Valve: The tricuspid valve is structurally normal. There is trace tricuspid regurgitation.  Pulmonic Valve: The pulmonic valve is structurally normal. There is trace pulmonic valve regurgitation.  Pericardium: There is no pericardial effusion noted.  Aorta: The aortic root is normal.  In comparison to the previous echocardiogram(s): Not performed on intraoperative study.    CONCLUSIONS:  1. Left ventricular systolic function is severely decreased.  2. There is moderately reduced right ventricular systolic function.  3. There are multiple wall motion abnormalities.    POST CARDIOPULMONARY BYPASS REPORT:  Patient has a normal sinus rhythm. Patient is on an epinephrine drip. LV function is unchanged from pre-pump exam. RV function is unchanged from pre-pump exam. No evidence of post  aortic cannulation dissection. Post CPB exam performed and interpreted by Dr. Manpreet Barney.    QUANTITATIVE DATA SUMMARY:    [id number] Roseanne Arreguin MD  Electronically signed on 6/2/2023 at 4:03:18 PM     Final     Problem List Items Addressed This Visit       Chronic combined systolic and diastolic heart failure (CMS/HCC) - Primary    Relevant Medications    carvedilol (Coreg) 6.25 mg tablet    clopidogrel (Plavix) 75 mg tablet    Coronary artery disease involving native coronary artery of native heart    Relevant Medications    carvedilol (Coreg) 6.25 mg tablet    clopidogrel (Plavix) 75 mg tablet    Essential hypertension, benign    Dyslipidemia    COPD (chronic obstructive pulmonary disease) (CMS/HCC)    History of tobacco use          Govind Watts DO, FACC, FACOI

## 2024-02-09 ENCOUNTER — OFFICE VISIT (OUTPATIENT)
Dept: CARDIOLOGY | Facility: CLINIC | Age: 54
End: 2024-02-09
Payer: COMMERCIAL

## 2024-02-09 VITALS
HEIGHT: 73 IN | BODY MASS INDEX: 26.32 KG/M2 | WEIGHT: 198.6 LBS | HEART RATE: 80 BPM | SYSTOLIC BLOOD PRESSURE: 120 MMHG | DIASTOLIC BLOOD PRESSURE: 88 MMHG

## 2024-02-09 DIAGNOSIS — J44.9 CHRONIC OBSTRUCTIVE PULMONARY DISEASE, UNSPECIFIED COPD TYPE (MULTI): ICD-10-CM

## 2024-02-09 DIAGNOSIS — I25.10 CORONARY ARTERY DISEASE INVOLVING NATIVE CORONARY ARTERY OF NATIVE HEART WITHOUT ANGINA PECTORIS: ICD-10-CM

## 2024-02-09 DIAGNOSIS — Z87.891 HISTORY OF TOBACCO USE: ICD-10-CM

## 2024-02-09 DIAGNOSIS — I50.42 CHRONIC COMBINED SYSTOLIC AND DIASTOLIC HEART FAILURE (MULTI): Primary | ICD-10-CM

## 2024-02-09 DIAGNOSIS — E78.5 DYSLIPIDEMIA: ICD-10-CM

## 2024-02-09 DIAGNOSIS — I10 ESSENTIAL HYPERTENSION, BENIGN: ICD-10-CM

## 2024-02-09 PROCEDURE — 3074F SYST BP LT 130 MM HG: CPT | Performed by: INTERNAL MEDICINE

## 2024-02-09 PROCEDURE — 93000 ELECTROCARDIOGRAM COMPLETE: CPT | Performed by: INTERNAL MEDICINE

## 2024-02-09 PROCEDURE — 3079F DIAST BP 80-89 MM HG: CPT | Performed by: INTERNAL MEDICINE

## 2024-02-09 PROCEDURE — 99214 OFFICE O/P EST MOD 30 MIN: CPT | Performed by: INTERNAL MEDICINE

## 2024-02-23 ENCOUNTER — OFFICE VISIT (OUTPATIENT)
Dept: PULMONOLOGY | Facility: HOSPITAL | Age: 54
End: 2024-02-23
Payer: COMMERCIAL

## 2024-02-23 VITALS
SYSTOLIC BLOOD PRESSURE: 115 MMHG | HEIGHT: 71 IN | DIASTOLIC BLOOD PRESSURE: 80 MMHG | OXYGEN SATURATION: 93 % | BODY MASS INDEX: 27.72 KG/M2 | RESPIRATION RATE: 16 BRPM | WEIGHT: 198 LBS | TEMPERATURE: 98.6 F | HEART RATE: 82 BPM

## 2024-02-23 DIAGNOSIS — J45.909 ASTHMA, UNSPECIFIED ASTHMA SEVERITY, UNSPECIFIED WHETHER COMPLICATED, UNSPECIFIED WHETHER PERSISTENT (HHS-HCC): Primary | ICD-10-CM

## 2024-02-23 DIAGNOSIS — F17.210 CIGARETTE SMOKER: ICD-10-CM

## 2024-02-23 DIAGNOSIS — J30.9 ALLERGIC RHINITIS, UNSPECIFIED SEASONALITY, UNSPECIFIED TRIGGER: ICD-10-CM

## 2024-02-23 DIAGNOSIS — J44.9 CHRONIC OBSTRUCTIVE PULMONARY DISEASE, UNSPECIFIED COPD TYPE (MULTI): ICD-10-CM

## 2024-02-23 PROCEDURE — 99214 OFFICE O/P EST MOD 30 MIN: CPT | Mod: ZK | Performed by: NURSE PRACTITIONER

## 2024-02-23 PROCEDURE — 3079F DIAST BP 80-89 MM HG: CPT | Performed by: NURSE PRACTITIONER

## 2024-02-23 PROCEDURE — 3074F SYST BP LT 130 MM HG: CPT | Performed by: NURSE PRACTITIONER

## 2024-02-23 PROCEDURE — 99215 OFFICE O/P EST HI 40 MIN: CPT | Performed by: NURSE PRACTITIONER

## 2024-02-23 RX ORDER — ALBUTEROL SULFATE 90 UG/1
2 AEROSOL, METERED RESPIRATORY (INHALATION) EVERY 4 HOURS PRN
Qty: 18 G | Refills: 11 | Status: SHIPPED | OUTPATIENT
Start: 2024-02-23

## 2024-02-23 RX ORDER — FLUTICASONE FUROATE AND VILANTEROL 200; 25 UG/1; UG/1
1 POWDER RESPIRATORY (INHALATION) DAILY
Qty: 1 EACH | Refills: 11 | Status: SHIPPED | OUTPATIENT
Start: 2024-02-23 | End: 2024-02-23 | Stop reason: WASHOUT

## 2024-02-23 RX ORDER — BUDESONIDE, GLYCOPYRROLATE, AND FORMOTEROL FUMARATE 160; 9; 4.8 UG/1; UG/1; UG/1
2 AEROSOL, METERED RESPIRATORY (INHALATION)
Qty: 10.7 G | Refills: 11 | Status: SHIPPED | OUTPATIENT
Start: 2024-02-23

## 2024-02-23 RX ORDER — TIOTROPIUM BROMIDE INHALATION SPRAY 3.12 UG/1
2 SPRAY, METERED RESPIRATORY (INHALATION) DAILY
Qty: 4 G | Refills: 11 | Status: SHIPPED | OUTPATIENT
Start: 2024-02-23 | End: 2024-02-23 | Stop reason: WASHOUT

## 2024-02-23 ASSESSMENT — ENCOUNTER SYMPTOMS
UNEXPECTED WEIGHT CHANGE: 0
COUGH: 1
FEVER: 0
SHORTNESS OF BREATH: 1
RHINORRHEA: 0
FATIGUE: 0
WHEEZING: 1
CHILLS: 0

## 2024-02-23 NOTE — PATIENT INSTRUCTIONS
Start on Breztri 2 puffs twice a day, everyday.   Continue albuterol as needed.   Please get CT scan of your chest for lung cancer screening.   Please get blood work when you have this drawn.   Call with any questions or concerns.  Follow up with me in 3 months.

## 2024-02-23 NOTE — PROGRESS NOTES
Subjective   Patient ID: Simón Rajput is a 53 y.o. male who presents for NPV for COPD.     HPI: Patient has PMH of CAD s/p CABG, HTN, HLD, COPD, nicotine dependence, and chronic systolic heart failure with EF 45%. He was referred for COPD management. He states that he gets shortness of breath on exertion, productive cough, and will hear wheezing. He does get seasonal allergies also. He denies any known history of asthma. He has been on Trelegy in the past and felt that this did not help as much as Spiriva respimat did. He is not currently on a maintenance inhaler. He is a current smoker at 1/2 ppd he mostly smoked 1-2.5 ppd x 30 years.     Review of Systems   Constitutional:  Negative for chills, fatigue, fever and unexpected weight change.   HENT:  Positive for congestion. Negative for postnasal drip and rhinorrhea.    Respiratory:  Positive for cough (denies hemoptysis.), shortness of breath and wheezing.    Cardiovascular:  Negative for chest pain and leg swelling.   All other systems reviewed and are negative.      Objective   Physical Exam  Vitals reviewed.   Constitutional:       Appearance: Normal appearance.   HENT:      Head: Normocephalic.   Cardiovascular:      Rate and Rhythm: Normal rate and regular rhythm.   Pulmonary:      Effort: Pulmonary effort is normal.      Breath sounds: Wheezing present.   Skin:     General: Skin is warm and dry.   Neurological:      Mental Status: He is alert.         Assessment/Plan   COPD/asthma overlap  Cigarette smoker   Allergic rhinitis     Plan:    -PFTs done 2/2023 with FEV1/FVC 42, FEV1 31-41, , moderately reduced DLCO. Discussed potential asthma component.   -Start Breztri 2 puffs BID.   -Continue albuterol prn.   -He is a current smoker at 1/2 ppd but mostly smoked 1-2.5 ppd x 30 years. Smoking cessation counseling provided for 5 min.    -LDCT ordered. A shared decision making was done regarding the importance of Low Dose CT chest in screening for lung  nodules. Discussed patient's risk factors for malignancy and qualifications for screening test. Discussed the follow up steps if nodules are found based on RAD guidelines. Patient understands and would proceed with Lung Cancer Screening CT chest protocol.    -IGE, RAST ordered. Eos elevated at 810.    Overall I will optimize management and obtain testing. Discussed overall prognosis with continued smoking. I instructed patient to call sooner if needed.

## 2024-03-23 ENCOUNTER — APPOINTMENT (OUTPATIENT)
Dept: RADIOLOGY | Facility: HOSPITAL | Age: 54
DRG: 190 | End: 2024-03-23
Payer: COMMERCIAL

## 2024-03-23 ENCOUNTER — APPOINTMENT (OUTPATIENT)
Dept: CARDIOLOGY | Facility: HOSPITAL | Age: 54
DRG: 190 | End: 2024-03-23
Payer: COMMERCIAL

## 2024-03-23 ENCOUNTER — HOSPITAL ENCOUNTER (INPATIENT)
Facility: HOSPITAL | Age: 54
LOS: 2 days | Discharge: HOME | DRG: 190 | End: 2024-03-25
Attending: EMERGENCY MEDICINE | Admitting: STUDENT IN AN ORGANIZED HEALTH CARE EDUCATION/TRAINING PROGRAM
Payer: COMMERCIAL

## 2024-03-23 DIAGNOSIS — I10 ESSENTIAL HYPERTENSION, BENIGN: ICD-10-CM

## 2024-03-23 DIAGNOSIS — J44.1 COPD EXACERBATION (MULTI): ICD-10-CM

## 2024-03-23 DIAGNOSIS — J96.01 ACUTE RESPIRATORY FAILURE WITH HYPOXIA (MULTI): Primary | ICD-10-CM

## 2024-03-23 DIAGNOSIS — I50.42 CHRONIC COMBINED SYSTOLIC AND DIASTOLIC HEART FAILURE (MULTI): ICD-10-CM

## 2024-03-23 DIAGNOSIS — I25.10 CORONARY ARTERY DISEASE INVOLVING NATIVE CORONARY ARTERY OF NATIVE HEART, UNSPECIFIED WHETHER ANGINA PRESENT: ICD-10-CM

## 2024-03-23 PROBLEM — R00.0 TACHYCARDIA: Status: ACTIVE | Noted: 2024-03-23

## 2024-03-23 PROBLEM — R73.9 HYPERGLYCEMIA: Status: ACTIVE | Noted: 2024-03-23

## 2024-03-23 PROBLEM — A41.9 SEPSIS (MULTI): Status: ACTIVE | Noted: 2024-03-23

## 2024-03-23 LAB
ALBUMIN SERPL BCP-MCNC: 4.2 G/DL (ref 3.4–5)
ALP SERPL-CCNC: 81 U/L (ref 33–120)
ALT SERPL W P-5'-P-CCNC: 10 U/L (ref 10–52)
ANION GAP SERPL CALC-SCNC: 11 MMOL/L (ref 10–20)
AST SERPL W P-5'-P-CCNC: 13 U/L (ref 9–39)
BASE EXCESS BLDV CALC-SCNC: 6.9 MMOL/L (ref -2–3)
BASOPHILS # BLD AUTO: 0.02 X10*3/UL (ref 0–0.1)
BASOPHILS NFR BLD AUTO: 0.2 %
BILIRUB SERPL-MCNC: 0.8 MG/DL (ref 0–1.2)
BNP SERPL-MCNC: 17 PG/ML (ref 0–99)
BODY TEMPERATURE: 37 DEGREES CELSIUS
BUN SERPL-MCNC: 15 MG/DL (ref 6–23)
CALCIUM SERPL-MCNC: 8.9 MG/DL (ref 8.6–10.3)
CARDIAC TROPONIN I PNL SERPL HS: <3 NG/L (ref 0–20)
CHLORIDE SERPL-SCNC: 98 MMOL/L (ref 98–107)
CO2 SERPL-SCNC: 29 MMOL/L (ref 21–32)
CREAT SERPL-MCNC: 1.12 MG/DL (ref 0.5–1.3)
EGFRCR SERPLBLD CKD-EPI 2021: 80 ML/MIN/1.73M*2
EOSINOPHIL # BLD AUTO: 0.02 X10*3/UL (ref 0–0.7)
EOSINOPHIL NFR BLD AUTO: 0.2 %
ERYTHROCYTE [DISTWIDTH] IN BLOOD BY AUTOMATED COUNT: 13.8 % (ref 11.5–14.5)
GLUCOSE SERPL-MCNC: 117 MG/DL (ref 74–99)
HCO3 BLDV-SCNC: 32 MMOL/L (ref 22–26)
HCT VFR BLD AUTO: 45.3 % (ref 41–52)
HGB BLD-MCNC: 15.7 G/DL (ref 13.5–17.5)
IMM GRANULOCYTES # BLD AUTO: 0.03 X10*3/UL (ref 0–0.7)
IMM GRANULOCYTES NFR BLD AUTO: 0.3 % (ref 0–0.9)
INHALED O2 CONCENTRATION: 21 %
INR PPP: 1.3 (ref 0.9–1.1)
LACTATE SERPL-SCNC: 0.9 MMOL/L (ref 0.4–2)
LYMPHOCYTES # BLD AUTO: 1.25 X10*3/UL (ref 1.2–4.8)
LYMPHOCYTES NFR BLD AUTO: 11.2 %
MAGNESIUM SERPL-MCNC: 2.11 MG/DL (ref 1.6–2.4)
MCH RBC QN AUTO: 30.4 PG (ref 26–34)
MCHC RBC AUTO-ENTMCNC: 34.7 G/DL (ref 32–36)
MCV RBC AUTO: 88 FL (ref 80–100)
MONOCYTES # BLD AUTO: 1.08 X10*3/UL (ref 0.1–1)
MONOCYTES NFR BLD AUTO: 9.6 %
NEUTROPHILS # BLD AUTO: 8.81 X10*3/UL (ref 1.2–7.7)
NEUTROPHILS NFR BLD AUTO: 78.5 %
NRBC BLD-RTO: 0 /100 WBCS (ref 0–0)
OXYHGB MFR BLDV: 77.9 % (ref 45–75)
PCO2 BLDV: 46 MM HG (ref 41–51)
PH BLDV: 7.45 PH (ref 7.33–7.43)
PLATELET # BLD AUTO: 211 X10*3/UL (ref 150–450)
PO2 BLDV: 47 MM HG (ref 35–45)
POTASSIUM SERPL-SCNC: 4.1 MMOL/L (ref 3.5–5.3)
PROT SERPL-MCNC: 7.6 G/DL (ref 6.4–8.2)
PROTHROMBIN TIME: 14.2 SECONDS (ref 9.8–12.8)
RBC # BLD AUTO: 5.16 X10*6/UL (ref 4.5–5.9)
SAO2 % BLDV: 81 % (ref 45–75)
SODIUM SERPL-SCNC: 134 MMOL/L (ref 136–145)
WBC # BLD AUTO: 11.2 X10*3/UL (ref 4.4–11.3)

## 2024-03-23 PROCEDURE — 71275 CT ANGIOGRAPHY CHEST: CPT | Performed by: RADIOLOGY

## 2024-03-23 PROCEDURE — 83605 ASSAY OF LACTIC ACID: CPT | Performed by: STUDENT IN AN ORGANIZED HEALTH CARE EDUCATION/TRAINING PROGRAM

## 2024-03-23 PROCEDURE — 96365 THER/PROPH/DIAG IV INF INIT: CPT

## 2024-03-23 PROCEDURE — 82805 BLOOD GASES W/O2 SATURATION: CPT | Performed by: EMERGENCY MEDICINE

## 2024-03-23 PROCEDURE — 84075 ASSAY ALKALINE PHOSPHATASE: CPT | Performed by: EMERGENCY MEDICINE

## 2024-03-23 PROCEDURE — 2500000001 HC RX 250 WO HCPCS SELF ADMINISTERED DRUGS (ALT 637 FOR MEDICARE OP): Performed by: STUDENT IN AN ORGANIZED HEALTH CARE EDUCATION/TRAINING PROGRAM

## 2024-03-23 PROCEDURE — 85610 PROTHROMBIN TIME: CPT | Performed by: EMERGENCY MEDICINE

## 2024-03-23 PROCEDURE — 36415 COLL VENOUS BLD VENIPUNCTURE: CPT | Performed by: STUDENT IN AN ORGANIZED HEALTH CARE EDUCATION/TRAINING PROGRAM

## 2024-03-23 PROCEDURE — 2500000002 HC RX 250 W HCPCS SELF ADMINISTERED DRUGS (ALT 637 FOR MEDICARE OP, ALT 636 FOR OP/ED): Performed by: STUDENT IN AN ORGANIZED HEALTH CARE EDUCATION/TRAINING PROGRAM

## 2024-03-23 PROCEDURE — 2550000001 HC RX 255 CONTRASTS: Performed by: EMERGENCY MEDICINE

## 2024-03-23 PROCEDURE — 87040 BLOOD CULTURE FOR BACTERIA: CPT | Mod: PORLAB | Performed by: STUDENT IN AN ORGANIZED HEALTH CARE EDUCATION/TRAINING PROGRAM

## 2024-03-23 PROCEDURE — 2500000004 HC RX 250 GENERAL PHARMACY W/ HCPCS (ALT 636 FOR OP/ED): Performed by: EMERGENCY MEDICINE

## 2024-03-23 PROCEDURE — 36415 COLL VENOUS BLD VENIPUNCTURE: CPT | Performed by: EMERGENCY MEDICINE

## 2024-03-23 PROCEDURE — 71045 X-RAY EXAM CHEST 1 VIEW: CPT

## 2024-03-23 PROCEDURE — 83735 ASSAY OF MAGNESIUM: CPT | Performed by: EMERGENCY MEDICINE

## 2024-03-23 PROCEDURE — 83880 ASSAY OF NATRIURETIC PEPTIDE: CPT | Performed by: EMERGENCY MEDICINE

## 2024-03-23 PROCEDURE — 2500000002 HC RX 250 W HCPCS SELF ADMINISTERED DRUGS (ALT 637 FOR MEDICARE OP, ALT 636 FOR OP/ED): Performed by: EMERGENCY MEDICINE

## 2024-03-23 PROCEDURE — 2500000004 HC RX 250 GENERAL PHARMACY W/ HCPCS (ALT 636 FOR OP/ED): Performed by: STUDENT IN AN ORGANIZED HEALTH CARE EDUCATION/TRAINING PROGRAM

## 2024-03-23 PROCEDURE — 96375 TX/PRO/DX INJ NEW DRUG ADDON: CPT

## 2024-03-23 PROCEDURE — 96367 TX/PROPH/DG ADDL SEQ IV INF: CPT

## 2024-03-23 PROCEDURE — 93005 ELECTROCARDIOGRAM TRACING: CPT

## 2024-03-23 PROCEDURE — 94640 AIRWAY INHALATION TREATMENT: CPT

## 2024-03-23 PROCEDURE — 1200000002 HC GENERAL ROOM WITH TELEMETRY DAILY

## 2024-03-23 PROCEDURE — 71275 CT ANGIOGRAPHY CHEST: CPT

## 2024-03-23 PROCEDURE — 71045 X-RAY EXAM CHEST 1 VIEW: CPT | Performed by: RADIOLOGY

## 2024-03-23 PROCEDURE — 99285 EMERGENCY DEPT VISIT HI MDM: CPT | Mod: 25

## 2024-03-23 PROCEDURE — 99223 1ST HOSP IP/OBS HIGH 75: CPT | Performed by: STUDENT IN AN ORGANIZED HEALTH CARE EDUCATION/TRAINING PROGRAM

## 2024-03-23 PROCEDURE — 85025 COMPLETE CBC W/AUTO DIFF WBC: CPT | Performed by: EMERGENCY MEDICINE

## 2024-03-23 PROCEDURE — 84484 ASSAY OF TROPONIN QUANT: CPT | Performed by: EMERGENCY MEDICINE

## 2024-03-23 RX ORDER — ALBUTEROL SULFATE 90 UG/1
2 AEROSOL, METERED RESPIRATORY (INHALATION) EVERY 4 HOURS PRN
Status: DISCONTINUED | OUTPATIENT
Start: 2024-03-23 | End: 2024-03-25 | Stop reason: HOSPADM

## 2024-03-23 RX ORDER — TALC
3 POWDER (GRAM) TOPICAL NIGHTLY
Status: DISCONTINUED | OUTPATIENT
Start: 2024-03-23 | End: 2024-03-25 | Stop reason: HOSPADM

## 2024-03-23 RX ORDER — PANTOPRAZOLE SODIUM 40 MG/10ML
40 INJECTION, POWDER, LYOPHILIZED, FOR SOLUTION INTRAVENOUS
Status: DISCONTINUED | OUTPATIENT
Start: 2024-03-24 | End: 2024-03-25 | Stop reason: HOSPADM

## 2024-03-23 RX ORDER — FLUTICASONE FUROATE AND VILANTEROL 200; 25 UG/1; UG/1
1 POWDER RESPIRATORY (INHALATION)
Status: DISCONTINUED | OUTPATIENT
Start: 2024-03-23 | End: 2024-03-25 | Stop reason: HOSPADM

## 2024-03-23 RX ORDER — GUAIFENESIN 600 MG/1
600 TABLET, EXTENDED RELEASE ORAL EVERY 12 HOURS PRN
Status: DISCONTINUED | OUTPATIENT
Start: 2024-03-23 | End: 2024-03-25 | Stop reason: HOSPADM

## 2024-03-23 RX ORDER — IPRATROPIUM BROMIDE AND ALBUTEROL SULFATE 2.5; .5 MG/3ML; MG/3ML
3 SOLUTION RESPIRATORY (INHALATION)
Status: DISCONTINUED | OUTPATIENT
Start: 2024-03-23 | End: 2024-03-23

## 2024-03-23 RX ORDER — IPRATROPIUM BROMIDE AND ALBUTEROL SULFATE 2.5; .5 MG/3ML; MG/3ML
3 SOLUTION RESPIRATORY (INHALATION) EVERY 20 MIN
Status: COMPLETED | OUTPATIENT
Start: 2024-03-23 | End: 2024-03-23

## 2024-03-23 RX ORDER — ENOXAPARIN SODIUM 100 MG/ML
40 INJECTION SUBCUTANEOUS EVERY 24 HOURS
Status: DISCONTINUED | OUTPATIENT
Start: 2024-03-23 | End: 2024-03-25 | Stop reason: HOSPADM

## 2024-03-23 RX ORDER — BISACODYL 10 MG/1
10 SUPPOSITORY RECTAL DAILY PRN
Status: DISCONTINUED | OUTPATIENT
Start: 2024-03-23 | End: 2024-03-25 | Stop reason: HOSPADM

## 2024-03-23 RX ORDER — BISACODYL 5 MG
10 TABLET, DELAYED RELEASE (ENTERIC COATED) ORAL DAILY PRN
Status: DISCONTINUED | OUTPATIENT
Start: 2024-03-23 | End: 2024-03-25 | Stop reason: HOSPADM

## 2024-03-23 RX ORDER — ASPIRIN 81 MG/1
81 TABLET ORAL DAILY
Status: DISCONTINUED | OUTPATIENT
Start: 2024-03-23 | End: 2024-03-25 | Stop reason: HOSPADM

## 2024-03-23 RX ORDER — IPRATROPIUM BROMIDE AND ALBUTEROL SULFATE 2.5; .5 MG/3ML; MG/3ML
3 SOLUTION RESPIRATORY (INHALATION)
Status: DISCONTINUED | OUTPATIENT
Start: 2024-03-23 | End: 2024-03-25 | Stop reason: HOSPADM

## 2024-03-23 RX ORDER — SODIUM CHLORIDE 9 MG/ML
125 INJECTION, SOLUTION INTRAVENOUS CONTINUOUS
Status: ACTIVE | OUTPATIENT
Start: 2024-03-23 | End: 2024-03-24

## 2024-03-23 RX ORDER — CARVEDILOL 6.25 MG/1
6.25 TABLET ORAL
Status: DISCONTINUED | OUTPATIENT
Start: 2024-03-23 | End: 2024-03-25 | Stop reason: HOSPADM

## 2024-03-23 RX ORDER — SPIRONOLACTONE 25 MG/1
25 TABLET ORAL DAILY
Status: DISCONTINUED | OUTPATIENT
Start: 2024-03-23 | End: 2024-03-23

## 2024-03-23 RX ORDER — ONDANSETRON HYDROCHLORIDE 2 MG/ML
4 INJECTION, SOLUTION INTRAVENOUS EVERY 8 HOURS PRN
Status: DISCONTINUED | OUTPATIENT
Start: 2024-03-23 | End: 2024-03-25 | Stop reason: HOSPADM

## 2024-03-23 RX ORDER — ONDANSETRON 4 MG/1
4 TABLET, FILM COATED ORAL EVERY 8 HOURS PRN
Status: DISCONTINUED | OUTPATIENT
Start: 2024-03-23 | End: 2024-03-25 | Stop reason: HOSPADM

## 2024-03-23 RX ORDER — CLOPIDOGREL BISULFATE 75 MG/1
75 TABLET ORAL DAILY
Status: DISCONTINUED | OUTPATIENT
Start: 2024-03-23 | End: 2024-03-25 | Stop reason: HOSPADM

## 2024-03-23 RX ORDER — MAGNESIUM SULFATE HEPTAHYDRATE 40 MG/ML
2 INJECTION, SOLUTION INTRAVENOUS ONCE
Status: COMPLETED | OUTPATIENT
Start: 2024-03-23 | End: 2024-03-23

## 2024-03-23 RX ORDER — CEFTRIAXONE 1 G/50ML
1 INJECTION, SOLUTION INTRAVENOUS EVERY 24 HOURS
Status: DISCONTINUED | OUTPATIENT
Start: 2024-03-23 | End: 2024-03-25 | Stop reason: HOSPADM

## 2024-03-23 RX ORDER — PANTOPRAZOLE SODIUM 40 MG/1
40 TABLET, DELAYED RELEASE ORAL
Status: DISCONTINUED | OUTPATIENT
Start: 2024-03-24 | End: 2024-03-25 | Stop reason: HOSPADM

## 2024-03-23 RX ADMIN — IPRATROPIUM BROMIDE AND ALBUTEROL SULFATE 3 ML: 2.5; .5 SOLUTION RESPIRATORY (INHALATION) at 13:48

## 2024-03-23 RX ADMIN — ENOXAPARIN SODIUM 40 MG: 40 INJECTION SUBCUTANEOUS at 20:48

## 2024-03-23 RX ADMIN — CEFTRIAXONE SODIUM 1 G: 1 INJECTION, SOLUTION INTRAVENOUS at 20:47

## 2024-03-23 RX ADMIN — METHYLPREDNISOLONE SODIUM SUCCINATE 125 MG: 125 INJECTION, POWDER, FOR SOLUTION INTRAMUSCULAR; INTRAVENOUS at 13:48

## 2024-03-23 RX ADMIN — IPRATROPIUM BROMIDE AND ALBUTEROL SULFATE 3 ML: 2.5; .5 SOLUTION RESPIRATORY (INHALATION) at 13:51

## 2024-03-23 RX ADMIN — IOHEXOL 75 ML: 350 INJECTION, SOLUTION INTRAVENOUS at 15:34

## 2024-03-23 RX ADMIN — METHYLPREDNISOLONE SODIUM SUCCINATE 40 MG: 40 INJECTION, POWDER, FOR SOLUTION INTRAMUSCULAR; INTRAVENOUS at 20:47

## 2024-03-23 RX ADMIN — MAGNESIUM SULFATE HEPTAHYDRATE 2 G: 40 INJECTION, SOLUTION INTRAVENOUS at 13:49

## 2024-03-23 RX ADMIN — SODIUM CHLORIDE 125 ML/HR: 9 INJECTION, SOLUTION INTRAVENOUS at 17:45

## 2024-03-23 RX ADMIN — IPRATROPIUM BROMIDE AND ALBUTEROL SULFATE 3 ML: 2.5; .5 SOLUTION RESPIRATORY (INHALATION) at 13:52

## 2024-03-23 RX ADMIN — AZITHROMYCIN MONOHYDRATE 500 MG: 500 INJECTION, POWDER, LYOPHILIZED, FOR SOLUTION INTRAVENOUS at 15:01

## 2024-03-23 RX ADMIN — Medication 3 MG: at 20:47

## 2024-03-23 RX ADMIN — SODIUM CHLORIDE 500 ML: 9 INJECTION, SOLUTION INTRAVENOUS at 13:48

## 2024-03-23 RX ADMIN — IPRATROPIUM BROMIDE AND ALBUTEROL SULFATE 3 ML: 2.5; .5 SOLUTION RESPIRATORY (INHALATION) at 21:29

## 2024-03-23 SDOH — SOCIAL STABILITY: SOCIAL INSECURITY: HAVE YOU HAD THOUGHTS OF HARMING ANYONE ELSE?: NO

## 2024-03-23 SDOH — SOCIAL STABILITY: SOCIAL INSECURITY: WERE YOU ABLE TO COMPLETE ALL THE BEHAVIORAL HEALTH SCREENINGS?: YES

## 2024-03-23 ASSESSMENT — ENCOUNTER SYMPTOMS
CHILLS: 0
MYALGIAS: 0
SORE THROAT: 0
RHINORRHEA: 1
ABDOMINAL PAIN: 0
HEMATURIA: 0
ABDOMINAL DISTENTION: 0
FLANK PAIN: 0
DECREASED CONCENTRATION: 0
SHORTNESS OF BREATH: 1
DIARRHEA: 0
COUGH: 1
HEADACHES: 0
WHEEZING: 1
JOINT SWELLING: 0
BACK PAIN: 0
NERVOUS/ANXIOUS: 0
COLOR CHANGE: 0
DIAPHORESIS: 0
FEVER: 0
STRIDOR: 0
APPETITE CHANGE: 0
WEAKNESS: 0
DYSURIA: 0
AGITATION: 0
WOUND: 0
CHEST TIGHTNESS: 0
APNEA: 0
SINUS PAIN: 0
NUMBNESS: 0
VOMITING: 0
DIZZINESS: 0
ARTHRALGIAS: 0
LIGHT-HEADEDNESS: 0
CONFUSION: 0
FREQUENCY: 0
DIFFICULTY URINATING: 0
SINUS PRESSURE: 1
FATIGUE: 0
PALPITATIONS: 0
CONSTIPATION: 0
NAUSEA: 0
ACTIVITY CHANGE: 0

## 2024-03-23 ASSESSMENT — COGNITIVE AND FUNCTIONAL STATUS - GENERAL
DAILY ACTIVITIY SCORE: 24
MOBILITY SCORE: 24
PATIENT BASELINE BEDBOUND: NO
MOBILITY SCORE: 24
DAILY ACTIVITIY SCORE: 24

## 2024-03-23 ASSESSMENT — LIFESTYLE VARIABLES
SKIP TO QUESTIONS 9-10: 1
AUDIT-C TOTAL SCORE: 0
HOW MANY STANDARD DRINKS CONTAINING ALCOHOL DO YOU HAVE ON A TYPICAL DAY: PATIENT DOES NOT DRINK
HAVE PEOPLE ANNOYED YOU BY CRITICIZING YOUR DRINKING: NO
PRESCIPTION_ABUSE_PAST_12_MONTHS: NO
EVER FELT BAD OR GUILTY ABOUT YOUR DRINKING: NO
EVER HAD A DRINK FIRST THING IN THE MORNING TO STEADY YOUR NERVES TO GET RID OF A HANGOVER: NO
TOTAL SCORE: 0
HOW OFTEN DO YOU HAVE A DRINK CONTAINING ALCOHOL: NEVER
HOW OFTEN DO YOU HAVE 6 OR MORE DRINKS ON ONE OCCASION: NEVER
AUDIT-C TOTAL SCORE: 0
SUBSTANCE_ABUSE_PAST_12_MONTHS: NO
HAVE YOU EVER FELT YOU SHOULD CUT DOWN ON YOUR DRINKING: NO

## 2024-03-23 ASSESSMENT — COLUMBIA-SUICIDE SEVERITY RATING SCALE - C-SSRS
2. HAVE YOU ACTUALLY HAD ANY THOUGHTS OF KILLING YOURSELF?: NO
6. HAVE YOU EVER DONE ANYTHING, STARTED TO DO ANYTHING, OR PREPARED TO DO ANYTHING TO END YOUR LIFE?: NO
6. HAVE YOU EVER DONE ANYTHING, STARTED TO DO ANYTHING, OR PREPARED TO DO ANYTHING TO END YOUR LIFE?: NO
1. IN THE PAST MONTH, HAVE YOU WISHED YOU WERE DEAD OR WISHED YOU COULD GO TO SLEEP AND NOT WAKE UP?: NO

## 2024-03-23 ASSESSMENT — ACTIVITIES OF DAILY LIVING (ADL)
FEEDING YOURSELF: INDEPENDENT
WALKS IN HOME: INDEPENDENT
DRESSING YOURSELF: INDEPENDENT
BATHING: INDEPENDENT
JUDGMENT_ADEQUATE_SAFELY_COMPLETE_DAILY_ACTIVITIES: YES
ADEQUATE_TO_COMPLETE_ADL: YES
TOILETING: INDEPENDENT
PATIENT'S MEMORY ADEQUATE TO SAFELY COMPLETE DAILY ACTIVITIES?: YES
HEARING - RIGHT EAR: FUNCTIONAL
HEARING - LEFT EAR: FUNCTIONAL
LACK_OF_TRANSPORTATION: NO
GROOMING: INDEPENDENT

## 2024-03-23 ASSESSMENT — PATIENT HEALTH QUESTIONNAIRE - PHQ9
2. FEELING DOWN, DEPRESSED OR HOPELESS: NOT AT ALL
1. LITTLE INTEREST OR PLEASURE IN DOING THINGS: NOT AT ALL
SUM OF ALL RESPONSES TO PHQ9 QUESTIONS 1 & 2: 0

## 2024-03-23 ASSESSMENT — PAIN SCALES - GENERAL
PAINLEVEL_OUTOF10: 0 - NO PAIN
PAINLEVEL_OUTOF10: 2

## 2024-03-23 ASSESSMENT — PAIN - FUNCTIONAL ASSESSMENT: PAIN_FUNCTIONAL_ASSESSMENT: 0-10

## 2024-03-23 NOTE — ED PROVIDER NOTES
Emergency Department Provider Note        MEDICAL DECISION MAKING:  Medical Decision Making  Amount and/or Complexity of Data Reviewed  External Data Reviewed: labs and notes.  Labs: ordered. Decision-making details documented in ED Course.  Radiology: ordered. Decision-making details documented in ED Course.  ECG/medicine tests: ordered and independent interpretation performed.    Risk  Decision regarding hospitalization.         3/23/24     Chief Complaint   Patient presents with    Shortness of Breath     Hx of COPD, sx x a few weeks, started a new job about a month ago.       History/Exam limitations: none.   Additional history was obtained from patient.    HPI: Simón Rajput  is a 53 y.o. presents with shortness of breath and nonproductive cough.  Ongoing for the past few weeks but got worse over the past day or 2.  Does not wear home oxygen.  Has a history of COPD, does smoke as well.  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  Thinks this has something to do with it.  Denies any chest pain, vomiting, diarrhea, fevers or chills.    Past medical records, Past medical history and surgical history reviewed and as documented.  History reviewed and as noted. past medical records reviewed.    Active Ambulatory Problems     Diagnosis Date Noted    Chronic combined systolic and diastolic heart failure (CMS/[facility]) 02/03/2024    Coronary artery disease involving native coronary artery of native heart 02/03/2024    Essential hypertension, benign 02/03/2024    Dyslipidemia 02/03/2024    COPD (chronic obstructive pulmonary disease) (CMS/[facility]) 02/03/2024    History of tobacco use 02/03/2024     Resolved Ambulatory Problems     Diagnosis Date Noted    No Resolved Ambulatory Problems     No Additional Past Medical History        History reviewed. No pertinent surgical history.     No family history on file.     Social History  "    Tobacco Use    Smoking status: Every Day     Packs/day: .5     Types: Cigarettes          Unless otherwise stated in this report the patient's positive and negative responses for review of systems for constitutional, eyes, ENT, cardiovascular, respiratory, gastrointestinal, neurological, genitourinary, musculoskeletal, and integument systems and related systems to the presenting problem are either as stated in the HPI or were not pertinent or were negative for the symptoms and/or complaints related to the presenting medical problem.    PHYSICAL EXAM  Triage/nursing notes and vital signs reviewed as available and as noted above.     Vitals:    03/23/24 1318 03/23/24 1319   BP: 110/63    Pulse: (!) 114    Resp: 18    Temp: 36.6 °C (97.8 °F)    TempSrc: Temporal    SpO2: (!) 90% (!) 90%   Weight: 89.8 kg (198 lb)    Height: 1.854 m (6' 1\")          Vital Signs Reviewed and as noted above    General Appearance  Appears well  Alert  No Distress    Neurological  Alert and conversant   Appropriate orientation   Speech Clear   Moves all extremities spontaneously    Neck  Nml Inspection  No Kernig/Brudzinski's  Trachea midline    HEENT  Head Atraumatic  Eyes Nml Inspection  Mucosa Moist    Respiratory  Respirations slightly labored with conversational dyspnea  Symmetrical expansion  Lungs sounds diminished bilaterally with wheezing throughout all fields    Cardiovascular  Rate tachycardic  Rhythm regular  Normal heart sounds  Pulses full,equal,   Brisk Capillary Refill    Abdomen/Pelvis  Bowel Sounds Present  Abdomen soft  Non-Tender  No distention    Extremity  Normal Appearance  No Pedal Edema    Skin  Color Nml  Warm, Dry    Psychiatric  Cooperative, no apparent risk to self or others    ED COURSE  Current subjective and objective findings, differential diagnosis includes but not limited to pneumonia, bronchitis, COPD exacerbation, acute coronary syndrome, pericardial effusion, pleural effusion, PE      Work-up " performed to evaluate for differential diagnosis as clinically indicated   Orders Placed This Encounter   Procedures    XR chest 1 view    CT angio chest for pulmonary embolism    CBC and Auto Differential    Magnesium    Comprehensive metabolic panel    Protime-INR    B-Type Natriuretic Peptide    Troponin I, High Sensitivity    Blood Gas, Venous    Vital Signs    ECG 12 lead    Insert and maintain peripheral IV          Labs and imaging reviewed by me  and note     12 Lead EKG: Performed at 2:14 PM  Reviewed and interpreted by me at time performed notes sinus tachycardia, 3 bpm.  No ST elevations or depressions.  Normal intervals.  No acute injury pattern      Labs Reviewed   CBC WITH AUTO DIFFERENTIAL - Abnormal       Result Value    WBC 11.2      nRBC 0.0      RBC 5.16      Hemoglobin 15.7      Hematocrit 45.3      MCV 88      MCH 30.4      MCHC 34.7      RDW 13.8      Platelets 211      Neutrophils % 78.5      Immature Granulocytes %, Automated 0.3      Lymphocytes % 11.2      Monocytes % 9.6      Eosinophils % 0.2      Basophils % 0.2      Neutrophils Absolute 8.81 (*)     Immature Granulocytes Absolute, Automated 0.03      Lymphocytes Absolute 1.25      Monocytes Absolute 1.08 (*)     Eosinophils Absolute 0.02      Basophils Absolute 0.02     COMPREHENSIVE METABOLIC PANEL - Abnormal    Glucose 117 (*)     Sodium 134 (*)     Potassium 4.1      Chloride 98      Bicarbonate 29      Anion Gap 11      Urea Nitrogen 15      Creatinine 1.12      eGFR 80      Calcium 8.9      Albumin 4.2      Alkaline Phosphatase 81      Total Protein 7.6      AST 13      Bilirubin, Total 0.8      ALT 10     PROTIME-INR - Abnormal    Protime 14.2 (*)     INR 1.3 (*)    BLOOD GAS VENOUS - Abnormal    POCT pH, Venous 7.45 (*)     POCT pCO2, Venous 46      POCT pO2, Venous 47 (*)     POCT SO2, Venous 81 (*)     POCT Oxy Hemoglobin, Venous 77.9 (*)     POCT Base Excess, Venous 6.9 (*)     POCT HCO3 Calculated, Venous 32.0 (*)     Patient  Temperature 37.0      FiO2 21     MAGNESIUM - Normal    Magnesium 2.11     B-TYPE NATRIURETIC PEPTIDE - Normal    BNP 17      Narrative:        <100 pg/mL - Heart failure unlikely  100-299 pg/mL - Intermediate probability of acute heart                  failure exacerbation. Correlate with clinical                  context and patient history.    >=300 pg/mL - Heart Failure likely. Correlate with clinical                  context and patient history.    BNP testing is performed using different testing methodology at [facility] than at other [facility]. Direct result comparisons should only be made within the same method.      TROPONIN I, HIGH SENSITIVITY - Normal    Troponin I, High Sensitivity <3      Narrative:     Less than 99th percentile of normal range cutoff-  Female and children under 18 years old <14 ng/L; Male <21 ng/L: Negative  Repeat testing should be performed if clinically indicated.     Female and children under 18 years old 14-50 ng/L; Male 21-50 ng/L:  Consistent with possible cardiac damage and possible increased clinical   risk. Serial measurements may help to assess extent of myocardial damage.     >50 ng/L: Consistent with cardiac damage, increased clinical risk and  myocardial infarction. Serial measurements may help assess extent of   myocardial damage.      NOTE: Children less than 1 year old may have higher baseline troponin   levels and results should be interpreted in conjunction with the overall   clinical context.     NOTE: Troponin I testing is performed using a different   testing methodology at [facility] than at other   [facility]. Direct result comparisons should only   be made within the same method.        XR chest 1 view   Final Result   Trace left pleural effusion.        MACRO:   None        Signed by: Irving Milligan 3/23/2024 1:46 PM   Dictation workstation:   [id number]               Pt course which Intervention and treatment  included :    Procedure  Procedures    Medications   azithromycin (Zithromax) in dextrose 5 % in water (D5W) 250 mL  mg (500 mg intravenous New Bag 3/23/24 1501)   sodium chloride 0.9 % bolus 500 mL (0 mL intravenous Stopped 3/23/24 1448)   magnesium sulfate IV 2 g (0 g intravenous Stopped 3/23/24 1409)   methylPREDNISolone sod succinate (SOLU-Medrol) injection 125 mg (125 mg intravenous Given 3/23/24 1348)   ipratropium-albuteroL (Duo-Neb) 0.5-2.5 mg/3 mL nebulizer solution 3 mL (3 mL nebulization Given 3/23/24 1352)   iohexol (OMNIPaque) 350 mg iodine/mL solution 75 mL (75 mL intravenous Given 3/23/24 1534)        ED Course as of 03/24/24 2342   Sat Mar 23, 2024   1451 Patient still slightly hypoxic however on exam wheezing  despite breathing treatments.  Hemodynamically stable, heart rate improved.  Due to patient's resting O2 sats of 88% patient will be admitted for respiratory failure and COPD exacerbation.  He was given azithromycin help cover for infectious process given longevity of symptoms and also help with inflammation [ML]      ED Course User Index  [ML] Marty R Lejeune,          Diagnoses as of 03/24/24 2342   Acute respiratory failure with hypoxia (CMS/HCC) - continue oxygen, wean as possible   COPD exacerbation (CMS/HCC) - empirical Rocephin zithromax and solumedrol. continue spiriva and duoneb          DISPOSITION: Admit to hospital  All imaging and laboratory results were discussed with the patient in detail including acute as well as incidental findings.  I discussed the differential, results and treatment plan with the patient and/or family/friend/caregiver if present.  I discussed the reason and need for admission to the hospital as well as risk and benefits.  Patient/family/caregiver/friend is in agreement with transfer as well as choice of facility to be transferred to.  Education and reassurance provided regards to presumed diagnosis was given.  Patient/family/caregiver understand and  all questions answered.      Critical Care Time: 22 minutes  I have personally spent  22 minutes of critical care time, exclusive of time spent on any  procedures, in evaluation and management of this critically ill patient’s condition of   1. Acute respiratory failure with hypoxia (CMS/[facility])        2. COPD exacerbation (CMS/[facility])             -------------------------------------------------------------------    03/23/24 at 2:14 PM - Marty R LeJeune, DO   Internal & Emergency Medicine          Marty R Lejeune, DO  Resident  03/23/24 1548

## 2024-03-23 NOTE — H&P
History Obtained From: Pt    History Of Present Illness:  Simón Rajput is a 53 y.o. male with PMHx s/f COPD, HTN, HlD, CAD s/p 3 vessel CABG 4/23 presenting with shortness of breath and cough for the past month. He is a longtime smoker of 0.5 ppd X 30 years (at least) and continues to smoke. Complains of worsening cough and some mild sputum production along with shortness of breath. 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 Admits to some URI symptoms including sinus congestion as well. No chest pain, nausea, vomiting, fever, chills, lightheadedness, abdominal pain, or leg swelling.    ED Course (Summary):   Vitals on presentation: 97.8 F, 114 bpm, 18 rr, 110/63, 90 on RA  Labs: CMP Na 134, BNP 17, trop <3, INR 1.3,   CBC WBC 11.2, Hg 15.7 Plt 211  VBG pH 7.45, pCO2 46, pO2 47  Imaging: CXR - trace left pleural effusion. Agree with interpretation.  CTA chest - no aortic dissection or acute PE. Atypical tree-in-bud infiltrates in the R upper and R lower lobes.  RUL opacity 1.3 cm. Recommend CT post-therapy to exclude mass.  Interventions: azithromycin, duoneb X1, Mag sulfate 2 g, NaCl 500 mL bolus. Admission for further treatment.      ED Course:  ED Course as of 03/23/24 1710   Sat Mar 23, 2024   1451 Patient still slightly hypoxic however on exam wheezing  despite breathing treatments.  Hemodynamically stable, heart rate improved.  Due to patient's resting O2 sats of 88% patient will be admitted for respiratory failure and COPD exacerbation.  He was given azithromycin help cover for infectious process given longevity of symptoms and also help with inflammation [ML]      ED Course User Index  [ML] Marty R Lejeune, DO         Diagnoses as of 03/23/24 1710   COPD exacerbation (CMS/HCC)   Acute respiratory failure with hypoxia (CMS/HCC)     Relevant Results  Results for orders placed or performed during the hospital encounter of 03/23/24 (from the past 24 hour(s))   CBC and  Auto Differential   Result Value Ref Range    WBC 11.2 4.4 - 11.3 x10*3/uL    nRBC 0.0 0.0 - 0.0 /100 WBCs    RBC 5.16 4.50 - 5.90 x10*6/uL    Hemoglobin 15.7 13.5 - 17.5 g/dL    Hematocrit 45.3 41.0 - 52.0 %    MCV 88 80 - 100 fL    MCH 30.4 26.0 - 34.0 pg    MCHC 34.7 32.0 - 36.0 g/dL    RDW 13.8 11.5 - 14.5 %    Platelets 211 150 - 450 x10*3/uL    Neutrophils % 78.5 40.0 - 80.0 %    Immature Granulocytes %, Automated 0.3 0.0 - 0.9 %    Lymphocytes % 11.2 13.0 - 44.0 %    Monocytes % 9.6 2.0 - 10.0 %    Eosinophils % 0.2 0.0 - 6.0 %    Basophils % 0.2 0.0 - 2.0 %    Neutrophils Absolute 8.81 (H) 1.20 - 7.70 x10*3/uL    Immature Granulocytes Absolute, Automated 0.03 0.00 - 0.70 x10*3/uL    Lymphocytes Absolute 1.25 1.20 - 4.80 x10*3/uL    Monocytes Absolute 1.08 (H) 0.10 - 1.00 x10*3/uL    Eosinophils Absolute 0.02 0.00 - 0.70 x10*3/uL    Basophils Absolute 0.02 0.00 - 0.10 x10*3/uL   Magnesium   Result Value Ref Range    Magnesium 2.11 1.60 - 2.40 mg/dL   Comprehensive metabolic panel   Result Value Ref Range    Glucose 117 (H) 74 - 99 mg/dL    Sodium 134 (L) 136 - 145 mmol/L    Potassium 4.1 3.5 - 5.3 mmol/L    Chloride 98 98 - 107 mmol/L    Bicarbonate 29 21 - 32 mmol/L    Anion Gap 11 10 - 20 mmol/L    Urea Nitrogen 15 6 - 23 mg/dL    Creatinine 1.12 0.50 - 1.30 mg/dL    eGFR 80 >60 mL/min/1.73m*2    Calcium 8.9 8.6 - 10.3 mg/dL    Albumin 4.2 3.4 - 5.0 g/dL    Alkaline Phosphatase 81 33 - 120 U/L    Total Protein 7.6 6.4 - 8.2 g/dL    AST 13 9 - 39 U/L    Bilirubin, Total 0.8 0.0 - 1.2 mg/dL    ALT 10 10 - 52 U/L   Protime-INR   Result Value Ref Range    Protime 14.2 (H) 9.8 - 12.8 seconds    INR 1.3 (H) 0.9 - 1.1   B-Type Natriuretic Peptide   Result Value Ref Range    BNP 17 0 - 99 pg/mL   Troponin I, High Sensitivity   Result Value Ref Range    Troponin I, High Sensitivity <3 0 - 20 ng/L   Blood Gas, Venous   Result Value Ref Range    POCT pH, Venous 7.45 (H) 7.33 - 7.43 pH    POCT pCO2, Venous 46 41 - 51  mm Hg    POCT pO2, Venous 47 (H) 35 - 45 mm Hg    POCT SO2, Venous 81 (H) 45 - 75 %    POCT Oxy Hemoglobin, Venous 77.9 (H) 45.0 - 75.0 %    POCT Base Excess, Venous 6.9 (H) -2.0 - 3.0 mmol/L    POCT HCO3 Calculated, Venous 32.0 (H) 22.0 - 26.0 mmol/L    Patient Temperature 37.0 degrees Celsius    FiO2 21 %      CT angio chest for pulmonary embolism    Result Date: 3/23/2024  Interpreted By:  Schoenberger, Joseph, STUDY: CT ANGIO CHEST FOR PULMONARY EMBOLISM;  3/23/2024 3:33 pm   INDICATION: Signs/Symptoms:Hypoxia, pleural effusion.   COMPARISON: None.   ACCESSION NUMBER(S): [id number]   ORDERING CLINICIAN: MARTY LEJEUNE   TECHNIQUE: Helical data acquisition of the chest was obtained with the intravenous injection of 75 cc Omnipaque 350 a a. Images were reformatted in coronal and sagittal planes. Axial and coronal MIP images were created and reviewed.   FINDINGS: POTENTIAL LIMITATIONS OF THE STUDY: None   HEART AND VESSELS: No discrete filling defects within the main pulmonary artery or its branches.   Main pulmonary artery and its branches are normal in caliber.   The thoracic aorta is normal in course and caliber with minimal atherosclerotic calcifications and normal luminal opacification with no evidence for dissection.   There are mild coronary atherosclerotic calcifications.The study is not optimized for evaluation of coronary arteries.   The cardiac chambers are not enlarged.   No evidence of pericardial effusion.   MEDIASTINUM AND OLEG, LOWER NECK AND AXILLA: The thyroid appears normal. No supraclavicular adenopathy.   No evidence of thoracic lymphadenopathy by CT criteria.   The esophagus is unremarkable in CT appearance.   LUNGS AND AIRWAYS: The trachea and central airways are patent. No endobronchial lesion.   There are moderate findings of centrilobular emphysema. There is a wedge-shaped opacity in the posterior right upper lobe abutting the superior aspect of the major fissure which could represent  mass or localized consolidation. Small infarct also this appearance. It measures 13 mm in AP dimension. Recommend CT follow-up. In there also multifocal tree-in-bud opacities minimally noted in the posterior right upper lobe but also noted in the basal segments of the right lower lobe suggesting an atypical infectious etiology. There is no pleural effusion or pneumothorax.   UPPER ABDOMEN: The visualized subdiaphragmatic structures demonstrate no remarkable findings.   CHEST WALL AND OSSEOUS STRUCTURES: There are no suspicious osseous lesions. Multilevel degenerative changes are present       1.  There is no CT finding of aortic dissection or acute pulmonary embolus. 2. Atypical tree-in-bud infiltrates in the right upper and right lower lobe. 3. More wedge-shaped consolidative opacity measuring 1.3 cm in the posterior right upper lobe based against the pleural surface. Recommend CT follow-up post therapy to include exclude underlying mass.     MACRO: None   Signed by: Joseph Schoenberger 3/23/2024 4:03 PM Dictation workstation:   [id number]    XR chest 1 view    Result Date: 3/23/2024  Interpreted By:  Irving Milligan, STUDY: Chest dated  3/23/2024.   INDICATION: Signs/Symptoms:sob   COMPARISON: Chest dated 06/01/2023.   ACCESSION NUMBER(S): [id number]   ORDERING CLINICIAN: MARTY LEJEUNE   TECHNIQUE: One view of the chest.   FINDINGS: There is minimal blunting of the left costophrenic angle.  No pneumothorax is evident. The cardiomediastinal silhouette is  not enlarged.The soft tissues are grossly unremarkable.       Trace left pleural effusion.   MACRO: None   Signed by: Irving Milligan 3/23/2024 1:46 PM Dictation workstation:   [id number]     Scheduled medications:  aspirin, 81 mg, oral, Daily  [START ON 3/24/2024] azithromycin, 500 mg, intravenous, q24h  carvedilol, 6.25 mg, oral, BID with meals  cefTRIAXone, 1 g, intravenous, q24h  clopidogrel, 75 mg, oral, Daily  enoxaparin, 40 mg, subcutaneous,  q24h  tiotropium, 2 puff, inhalation, Daily   And  fluticasone furoate-vilanteroL, 1 puff, inhalation, Daily  ipratropium-albuteroL, 3 mL, nebulization, q6h  melatonin, 3 mg, oral, Nightly  methylPREDNISolone sodium succinate (PF), 40 mg, intravenous, q12h  [START ON 3/24/2024] pantoprazole, 40 mg, oral, Daily before breakfast   Or  [START ON 3/24/2024] pantoprazole, 40 mg, intravenous, Daily before breakfast      Continuous medications:  sodium chloride 0.9%, 125 mL/hr      PRN medications:  PRN medications: albuterol, bisacodyl, bisacodyl, guaiFENesin, ondansetron **OR** ondansetron      Past Medical History  He has no past medical history on file.    Surgical History  He has no past surgical history on file.     Social History  He reports that he has been smoking cigarettes. He has been smoking an average of .5 packs per day. He does not have any smokeless tobacco history on file. No history on file for alcohol use and drug use.    Family History  No family history on file.     Allergies  Patient has no known allergies.    Code Status  Full Code     Review of Systems   Constitutional:  Negative for activity change, appetite change, chills, diaphoresis, fatigue and fever.   HENT:  Positive for rhinorrhea and sinus pressure. Negative for congestion, ear pain, sinus pain and sore throat.    Respiratory:  Positive for cough, shortness of breath and wheezing. Negative for apnea, chest tightness and stridor.    Cardiovascular:  Negative for chest pain, palpitations and leg swelling.   Gastrointestinal:  Negative for abdominal distention, abdominal pain, constipation, diarrhea, nausea and vomiting.   Genitourinary:  Negative for difficulty urinating, dysuria, flank pain, frequency, hematuria and urgency.   Musculoskeletal:  Negative for arthralgias, back pain, gait problem, joint swelling and myalgias.   Skin:  Negative for color change, pallor, rash and wound.   Neurological:  Negative for dizziness, syncope, weakness,  light-headedness, numbness and headaches.   Psychiatric/Behavioral:  Negative for agitation, behavioral problems, confusion and decreased concentration. The patient is not nervous/anxious.    All other systems reviewed and are negative.      Last Recorded Vitals  /67   Pulse 84   Temp 36.6 °C (97.8 °F) (Temporal)   Resp 20   Wt 89.8 kg (198 lb)   SpO2 95%      Physical Exam  Vitals and nursing note reviewed.   Constitutional:       General: He is in acute distress.      Appearance: He is normal weight. He is ill-appearing. He is not toxic-appearing.   HENT:      Head: Normocephalic and atraumatic.      Mouth/Throat:      Mouth: Mucous membranes are moist.      Pharynx: No posterior oropharyngeal erythema.   Eyes:      Extraocular Movements: Extraocular movements intact.      Pupils: Pupils are equal, round, and reactive to light.   Cardiovascular:      Rate and Rhythm: Regular rhythm. Tachycardia present.      Heart sounds: Normal heart sounds. No murmur heard.     No friction rub. No gallop.   Pulmonary:      Effort: Respiratory distress present.      Breath sounds: No stridor. Wheezing present. No rhonchi or rales.   Abdominal:      General: Abdomen is flat. Bowel sounds are normal. There is no distension.      Palpations: Abdomen is soft. There is no mass.      Tenderness: There is no abdominal tenderness. There is no right CVA tenderness, left CVA tenderness, guarding or rebound.   Musculoskeletal:         General: No swelling, tenderness, deformity or signs of injury. Normal range of motion.      Right lower leg: No edema.      Left lower leg: No edema.   Skin:     General: Skin is warm and dry.      Coloration: Skin is not jaundiced or pale.      Findings: No bruising, erythema, lesion or rash.   Neurological:      General: No focal deficit present.      Mental Status: He is alert and oriented to person, place, and time. Mental status is at baseline.      Cranial Nerves: No cranial nerve deficit.       Sensory: No sensory deficit.      Motor: No weakness.   Psychiatric:         Mood and Affect: Mood normal.         Behavior: Behavior normal.         Thought Content: Thought content normal.         Judgment: Judgment normal.         Assessment/Plan   Principal Problem:    COPD exacerbation (CMS/HCC)  Active Problems:    Chronic combined systolic and diastolic heart failure (CMS/HCC)    Coronary artery disease involving native coronary artery of native heart    Essential hypertension, benign    Dyslipidemia    Acute respiratory failure with hypoxia (CMS/HCC)    Tachycardia    Hyperglycemia    Sepsis (CMS/HCC)      Plan:  Admit to gen med    COPD exacerbation with sepsis:  Rocephin, azithromycin  Bcx X2.  500 mL bolus given. Gentle hydration given hx of CHF.  Check lactic  Solumedrol BID  Duonebs  Home inhalers    CAD  Home ASA, Coreg, Plavix    CHF  Hold home Entresto and Aldactone for now.    Cardiac diet  DVT ppx: Lovenox  Full code per discussion with pt.       Malik Walker, DO [employer] dictation software was used to dictate this note and thus there may be minor errors in translation/transcription including garbled speech or misspellings. Please contact for clarification if needed.

## 2024-03-23 NOTE — CARE PLAN
The patient's goals for the shift include      The clinical goals for the shift include      Problem: Skin  Goal: Prevent/manage excess moisture  Flowsheets (Taken 3/23/2024 1755)  Prevent/manage excess moisture: Moisturize dry skin  Goal: Prevent/minimize sheer/friction injuries  Flowsheets (Taken 3/23/2024 1755)  Prevent/minimize sheer/friction injuries:   Use pull sheet   HOB 30 degrees or less   Turn/reposition every 2 hours/use positioning/transfer devices

## 2024-03-24 LAB
ANION GAP SERPL CALC-SCNC: 12 MMOL/L (ref 10–20)
BUN SERPL-MCNC: 14 MG/DL (ref 6–23)
CALCIUM SERPL-MCNC: 7.9 MG/DL (ref 8.6–10.3)
CHLORIDE SERPL-SCNC: 103 MMOL/L (ref 98–107)
CO2 SERPL-SCNC: 25 MMOL/L (ref 21–32)
CREAT SERPL-MCNC: 0.97 MG/DL (ref 0.5–1.3)
EGFRCR SERPLBLD CKD-EPI 2021: >90 ML/MIN/1.73M*2
ERYTHROCYTE [DISTWIDTH] IN BLOOD BY AUTOMATED COUNT: 13.6 % (ref 11.5–14.5)
GLUCOSE SERPL-MCNC: 153 MG/DL (ref 74–99)
HCT VFR BLD AUTO: 39.4 % (ref 41–52)
HGB BLD-MCNC: 13.4 G/DL (ref 13.5–17.5)
MCH RBC QN AUTO: 30 PG (ref 26–34)
MCHC RBC AUTO-ENTMCNC: 34 G/DL (ref 32–36)
MCV RBC AUTO: 88 FL (ref 80–100)
NRBC BLD-RTO: 0 /100 WBCS (ref 0–0)
PLATELET # BLD AUTO: 192 X10*3/UL (ref 150–450)
POTASSIUM SERPL-SCNC: 4.6 MMOL/L (ref 3.5–5.3)
RBC # BLD AUTO: 4.46 X10*6/UL (ref 4.5–5.9)
SODIUM SERPL-SCNC: 135 MMOL/L (ref 136–145)
WBC # BLD AUTO: 10.6 X10*3/UL (ref 4.4–11.3)

## 2024-03-24 PROCEDURE — 80048 BASIC METABOLIC PNL TOTAL CA: CPT | Performed by: STUDENT IN AN ORGANIZED HEALTH CARE EDUCATION/TRAINING PROGRAM

## 2024-03-24 PROCEDURE — 94640 AIRWAY INHALATION TREATMENT: CPT

## 2024-03-24 PROCEDURE — 2500000001 HC RX 250 WO HCPCS SELF ADMINISTERED DRUGS (ALT 637 FOR MEDICARE OP): Performed by: STUDENT IN AN ORGANIZED HEALTH CARE EDUCATION/TRAINING PROGRAM

## 2024-03-24 PROCEDURE — 99233 SBSQ HOSP IP/OBS HIGH 50: CPT | Performed by: INTERNAL MEDICINE

## 2024-03-24 PROCEDURE — 1200000002 HC GENERAL ROOM WITH TELEMETRY DAILY

## 2024-03-24 PROCEDURE — 2500000002 HC RX 250 W HCPCS SELF ADMINISTERED DRUGS (ALT 637 FOR MEDICARE OP, ALT 636 FOR OP/ED): Performed by: STUDENT IN AN ORGANIZED HEALTH CARE EDUCATION/TRAINING PROGRAM

## 2024-03-24 PROCEDURE — 36415 COLL VENOUS BLD VENIPUNCTURE: CPT | Performed by: STUDENT IN AN ORGANIZED HEALTH CARE EDUCATION/TRAINING PROGRAM

## 2024-03-24 PROCEDURE — 2500000004 HC RX 250 GENERAL PHARMACY W/ HCPCS (ALT 636 FOR OP/ED): Performed by: STUDENT IN AN ORGANIZED HEALTH CARE EDUCATION/TRAINING PROGRAM

## 2024-03-24 PROCEDURE — 85027 COMPLETE CBC AUTOMATED: CPT | Performed by: STUDENT IN AN ORGANIZED HEALTH CARE EDUCATION/TRAINING PROGRAM

## 2024-03-24 RX ADMIN — IPRATROPIUM BROMIDE AND ALBUTEROL SULFATE 3 ML: 2.5; .5 SOLUTION RESPIRATORY (INHALATION) at 11:21

## 2024-03-24 RX ADMIN — METHYLPREDNISOLONE SODIUM SUCCINATE 40 MG: 40 INJECTION, POWDER, FOR SOLUTION INTRAMUSCULAR; INTRAVENOUS at 21:37

## 2024-03-24 RX ADMIN — AZITHROMYCIN MONOHYDRATE 500 MG: 500 INJECTION, POWDER, LYOPHILIZED, FOR SOLUTION INTRAVENOUS at 15:37

## 2024-03-24 RX ADMIN — IPRATROPIUM BROMIDE AND ALBUTEROL SULFATE 3 ML: 2.5; .5 SOLUTION RESPIRATORY (INHALATION) at 20:50

## 2024-03-24 RX ADMIN — IPRATROPIUM BROMIDE AND ALBUTEROL SULFATE 3 ML: 2.5; .5 SOLUTION RESPIRATORY (INHALATION) at 07:46

## 2024-03-24 RX ADMIN — METHYLPREDNISOLONE SODIUM SUCCINATE 40 MG: 40 INJECTION, POWDER, FOR SOLUTION INTRAMUSCULAR; INTRAVENOUS at 09:12

## 2024-03-24 RX ADMIN — CARVEDILOL 6.25 MG: 6.25 TABLET, FILM COATED ORAL at 09:12

## 2024-03-24 RX ADMIN — FLUTICASONE FUROATE AND VILANTEROL TRIFENATATE 1 PUFF: 200; 25 POWDER RESPIRATORY (INHALATION) at 09:12

## 2024-03-24 RX ADMIN — CLOPIDOGREL 75 MG: 75 TABLET ORAL at 09:12

## 2024-03-24 RX ADMIN — ENOXAPARIN SODIUM 40 MG: 40 INJECTION SUBCUTANEOUS at 20:41

## 2024-03-24 RX ADMIN — TIOTROPIUM BROMIDE INHALATION SPRAY 2 PUFF: 3.12 SPRAY, METERED RESPIRATORY (INHALATION) at 09:12

## 2024-03-24 RX ADMIN — Medication 3 MG: at 20:41

## 2024-03-24 RX ADMIN — PANTOPRAZOLE SODIUM 40 MG: 40 TABLET, DELAYED RELEASE ORAL at 05:53

## 2024-03-24 RX ADMIN — CARVEDILOL 6.25 MG: 6.25 TABLET, FILM COATED ORAL at 15:38

## 2024-03-24 RX ADMIN — CEFTRIAXONE SODIUM 1 G: 1 INJECTION, SOLUTION INTRAVENOUS at 15:38

## 2024-03-24 RX ADMIN — ASPIRIN 81 MG: 81 TABLET, COATED ORAL at 09:12

## 2024-03-24 RX ADMIN — SODIUM CHLORIDE 125 ML/HR: 9 INJECTION, SOLUTION INTRAVENOUS at 01:47

## 2024-03-24 ASSESSMENT — COGNITIVE AND FUNCTIONAL STATUS - GENERAL
DAILY ACTIVITIY SCORE: 24
MOBILITY SCORE: 24
DAILY ACTIVITIY SCORE: 24
MOBILITY SCORE: 24

## 2024-03-24 ASSESSMENT — PAIN SCALES - GENERAL
PAINLEVEL_OUTOF10: 0 - NO PAIN
PAINLEVEL_OUTOF10: 0 - NO PAIN

## 2024-03-24 NOTE — CARE PLAN
The patient's goals for the shift include      The clinical goals for the shift include Pt. will remain comfortable throughout the shift.      Problem: Skin  Goal: Prevent/manage excess moisture  Outcome: Progressing  Flowsheets (Taken 3/24/2024 1104)  Prevent/manage excess moisture: Moisturize dry skin  Goal: Prevent/minimize sheer/friction injuries  Outcome: Progressing  Flowsheets (Taken 3/24/2024 1104)  Prevent/minimize sheer/friction injuries:   Use pull sheet   HOB 30 degrees or less   Turn/reposition every 2 hours/use positioning/transfer devices     Problem: Pain  Goal: Takes deep breaths with improved pain control throughout the shift  Outcome: Progressing  Goal: Turns in bed with improved pain control throughout the shift  Outcome: Progressing  Goal: Walks with improved pain control throughout the shift  Outcome: Progressing  Goal: Performs ADL's with improved pain control throughout shift  Outcome: Progressing  Goal: Participates in PT with improved pain control throughout the shift  Outcome: Progressing  Goal: Free from opioid side effects throughout the shift  Outcome: Progressing  Goal: Free from acute confusion related to pain meds throughout the shift  Outcome: Progressing     Problem: Respiratory  Goal: Clear secretions with interventions this shift  Outcome: Progressing  Goal: Minimize anxiety/maximize coping throughout shift  Outcome: Progressing  Goal: Minimal/no exertional discomfort or dyspnea this shift  Outcome: Progressing  Goal: No signs of respiratory distress (eg. Use of accessory muscles. Peds grunting)  Outcome: Progressing  Goal: Patent airway maintained this shift  Outcome: Progressing  Goal: Tolerate mechanical ventilation evidenced by VS/agitation level this shift  Outcome: Progressing  Goal: Tolerate pulmonary toileting this shift  Outcome: Progressing  Goal: Verbalize decreased shortness of breath this shift  Outcome: Progressing  Goal: Wean oxygen to maintain O2 saturation per  order/standard this shift  Outcome: Progressing  Goal: Increase self care and/or family involvement in next 24 hours  Outcome: Progressing     Problem: Pain - Adult  Goal: Verbalizes/displays adequate comfort level or baseline comfort level  Outcome: Progressing     Problem: Safety - Adult  Goal: Free from fall injury  Outcome: Progressing     Problem: Discharge Planning  Goal: Discharge to home or other facility with appropriate resources  Outcome: Progressing     Problem: Chronic Conditions and Co-morbidities  Goal: Patient's chronic conditions and co-morbidity symptoms are monitored and maintained or improved  Outcome: Progressing     Problem: Heart Failure  Goal: Improved gas exchange this shift  Outcome: Progressing  Goal: Improved urinary output this shift  Outcome: Progressing  Goal: Reduction in peripheral edema within 24 hours  Outcome: Progressing  Goal: Report improvement of dyspnea/breathlessness this shift  Outcome: Progressing  Goal: Weight from fluid excess reduced over 2-3 days, then stabilize  Outcome: Progressing  Goal: Increase self care and/or family involvement in 24 hours  Outcome: Progressing

## 2024-03-24 NOTE — CARE PLAN
Problem: Skin  Goal: Prevent/manage excess moisture  Outcome: Progressing  Goal: Prevent/minimize sheer/friction injuries  Outcome: Progressing  Flowsheets (Taken 3/23/2024 2149)  Prevent/minimize sheer/friction injuries: Turn/reposition every 2 hours/use positioning/transfer devices     Problem: Pain  Goal: Takes deep breaths with improved pain control throughout the shift  Outcome: Progressing  Goal: Turns in bed with improved pain control throughout the shift  Outcome: Progressing  Goal: Walks with improved pain control throughout the shift  Outcome: Progressing  Goal: Performs ADL's with improved pain control throughout shift  Outcome: Progressing  Goal: Participates in PT with improved pain control throughout the shift  Outcome: Progressing  Goal: Free from opioid side effects throughout the shift  Outcome: Progressing  Goal: Free from acute confusion related to pain meds throughout the shift  Outcome: Progressing     Problem: Respiratory  Goal: Clear secretions with interventions this shift  Outcome: Progressing  Goal: Minimize anxiety/maximize coping throughout shift  Outcome: Progressing  Goal: Minimal/no exertional discomfort or dyspnea this shift  Outcome: Progressing  Goal: No signs of respiratory distress (eg. Use of accessory muscles. Peds grunting)  Outcome: Progressing  Goal: Patent airway maintained this shift  Outcome: Progressing  Goal: Tolerate mechanical ventilation evidenced by VS/agitation level this shift  Outcome: Progressing  Goal: Tolerate pulmonary toileting this shift  Outcome: Progressing  Goal: Verbalize decreased shortness of breath this shift  Outcome: Progressing  Goal: Wean oxygen to maintain O2 saturation per order/standard this shift  Outcome: Progressing  Goal: Increase self care and/or family involvement in next 24 hours  Outcome: Progressing     Problem: Pain - Adult  Goal: Verbalizes/displays adequate comfort level or baseline comfort level  Outcome: Progressing     Problem:  Safety - Adult  Goal: Free from fall injury  Outcome: Progressing     Problem: Discharge Planning  Goal: Discharge to home or other facility with appropriate resources  Outcome: Progressing     Problem: Chronic Conditions and Co-morbidities  Goal: Patient's chronic conditions and co-morbidity symptoms are monitored and maintained or improved  Outcome: Progressing     Problem: Heart Failure  Goal: Improved gas exchange this shift  Outcome: Progressing  Goal: Improved urinary output this shift  Outcome: Progressing  Goal: Reduction in peripheral edema within 24 hours  Outcome: Progressing  Goal: Report improvement of dyspnea/breathlessness this shift  Outcome: Progressing  Goal: Weight from fluid excess reduced over 2-3 days, then stabilize  Outcome: Progressing  Goal: Increase self care and/or family involvement in 24 hours  Outcome: Progressing

## 2024-03-24 NOTE — PROGRESS NOTES
Simón Rajput is a 53 y.o. male on day 1 of admission presenting with COPD exacerbation (CMS/[facility]).      Subjective      Simón Rajput is a 53 y.o. male with PMHx s/f COPD, HTN, HlD, CAD s/p 3 vessel CABG 4/23 presenting with shortness of breath and cough for the past month. He is a longtime smoker of 0.5 ppd X 30 years (at least) and continues to smoke. Complains of worsening cough and some mild sputum production along with shortness of breath. 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 Admits to some URI symptoms including sinus congestion as well. No chest pain, nausea, vomiting, fever, chills, lightheadedness, abdominal pain, or leg swelling.     ED Course (Summary):   Vitals on presentation: 97.8 F, 114 bpm, 18 rr, 110/63, 90 on RA  Labs: CMP Na 134, BNP 17, trop <3, INR 1.3,   CBC WBC 11.2, Hg 15.7 Plt 211  VBG pH 7.45, pCO2 46, pO2 47  Imaging: CXR - trace left pleural effusion. Agree with interpretation.  CTA chest - no aortic dissection or acute PE. Atypical tree-in-bud infiltrates in the R upper and R lower lobes.  RUL opacity 1.3 cm. Recommend CT post-therapy to exclude mass.  Interventions: azithromycin, duoneb X1, Mag sulfate 2 g, NaCl 500 mL bolus. Admission for further treatment.        ED Course:      ED Course as of 03/23/24 1710   Sat Mar 23, 2024   1451 Patient still slightly hypoxic however on exam wheezing  despite breathing treatments.  Hemodynamically stable, heart rate improved.  Due to patient's resting O2 sats of 88% patient will be admitted for respiratory failure and COPD exacerbation.  He was given azithromycin help cover for infectious process given longevity of symptoms and also help with inflammation [ML]     3/24/2024: Patient remained NC Oxygen 1 liters. NO acute events overnight. Continue empirical Abx, steroids and bronchodilator. Continue risk modifications.    Objective     Last Recorded Vitals  /63 (BP Location: Left arm, Patient  Position: Lying)   Pulse 66   Temp 36.7 °C (98.1 °F) (Temporal)   Resp 16   Wt 89.8 kg (198 lb)   SpO2 91%   Intake/Output last 3 Shifts:    Intake/Output Summary (Last 24 hours) at 3/24/2024 1603  Last data filed at 3/24/2024 1258  Gross per 24 hour   Intake 3194.58 ml   Output --   Net 3194.58 ml       Admission Weight  Weight: 89.8 kg (198 lb) (03/23/24 1318)    Daily Weight  03/23/24 : 89.8 kg (198 lb)    Image Results  CT angio chest for pulmonary embolism  Narrative: Interpreted By:  Schoenberger, Joseph,   STUDY:  CT ANGIO CHEST FOR PULMONARY EMBOLISM;  3/23/2024 3:33 pm      INDICATION:  Signs/Symptoms:Hypoxia, pleural effusion.      COMPARISON:  None.      ACCESSION NUMBER(S):  [id number]      ORDERING CLINICIAN:  MARTY LEJEUNE      TECHNIQUE:  Helical data acquisition of the chest was obtained with the  intravenous injection of 75 cc Omnipaque 350 a a. Images were  reformatted in coronal and sagittal planes. Axial and coronal MIP  images were created and reviewed.      FINDINGS:  POTENTIAL LIMITATIONS OF THE STUDY: None      HEART AND VESSELS:  No discrete filling defects within the main pulmonary artery or its  branches.      Main pulmonary artery and its branches are normal in caliber.      The thoracic aorta is normal in course and caliber with minimal  atherosclerotic calcifications and normal luminal opacification with  no evidence for dissection.      There are mild coronary atherosclerotic calcifications.The study is  not optimized for evaluation of coronary arteries.      The cardiac chambers are not enlarged.      No evidence of pericardial effusion.      MEDIASTINUM AND OLEG, LOWER NECK AND AXILLA:  The thyroid appears normal. No supraclavicular adenopathy.      No evidence of thoracic lymphadenopathy by CT criteria.      The esophagus is unremarkable in CT appearance.      LUNGS AND AIRWAYS:  The trachea and central airways are patent. No endobronchial lesion.      There are moderate  findings of centrilobular emphysema. There is a  wedge-shaped opacity in the posterior right upper lobe abutting the  superior aspect of the major fissure which could represent mass or  localized consolidation. Small infarct also this appearance. It  measures 13 mm in AP dimension. Recommend CT follow-up. In there also  multifocal tree-in-bud opacities minimally noted in the posterior  right upper lobe but also noted in the basal segments of the right  lower lobe suggesting an atypical infectious etiology. There is no  pleural effusion or pneumothorax.      UPPER ABDOMEN:  The visualized subdiaphragmatic structures demonstrate no remarkable  findings.      CHEST WALL AND OSSEOUS STRUCTURES:  There are no suspicious osseous lesions. Multilevel degenerative  changes are present      Impression: 1.  There is no CT finding of aortic dissection or acute pulmonary  embolus.  2. Atypical tree-in-bud infiltrates in the right upper and right  lower lobe.  3. More wedge-shaped consolidative opacity measuring 1.3 cm in the  posterior right upper lobe based against the pleural surface.  Recommend CT follow-up post therapy to include exclude underlying  mass.          MACRO:  None      Signed by: Joseph Schoenberger 3/23/2024 4:03 PM  Dictation workstation:   [id number]  XR chest 1 view  Narrative: Interpreted By:  Irving Milligan,   STUDY:  Chest dated  3/23/2024.      INDICATION:  Signs/Symptoms:sob      COMPARISON:  Chest dated 06/01/2023.      ACCESSION NUMBER(S):  [id number]      ORDERING CLINICIAN:  MARTY LEJEUNE      TECHNIQUE:  One view of the chest.      FINDINGS:  There is minimal blunting of the left costophrenic angle.  No  pneumothorax is evident. The cardiomediastinal silhouette is  not  enlarged.The soft tissues are grossly unremarkable.      Impression: Trace left pleural effusion.      MACRO:  None      Signed by: Irving Milligan 3/23/2024 1:46 PM  Dictation workstation:   [id number]      Physical  Exam  Constitutional:       Appearance: He is ill-appearing.   HENT:      Head: Normocephalic.      Mouth/Throat:      Mouth: Mucous membranes are moist.      Pharynx: Oropharynx is clear.   Eyes:      Pupils: Pupils are equal, round, and reactive to light.   Cardiovascular:      Rate and Rhythm: Normal rate and regular rhythm.      Heart sounds: Normal heart sounds. No murmur heard.     No gallop.   Pulmonary:      Breath sounds: Wheezing and rales present.   Abdominal:      General: Bowel sounds are normal. There is no distension.      Palpations: Abdomen is soft.      Tenderness: There is no abdominal tenderness.   Musculoskeletal:         General: No swelling. Normal range of motion.      Cervical back: Neck supple. No rigidity.   Skin:     General: Skin is warm and dry.   Neurological:      General: No focal deficit present.      Mental Status: He is alert.      Cranial Nerves: No cranial nerve deficit.      Sensory: No sensory deficit.   Psychiatric:         Mood and Affect: Mood normal.         Behavior: Behavior normal.         Relevant Results             Scheduled medications  aspirin, 81 mg, oral, Daily  azithromycin, 500 mg, intravenous, q24h  carvedilol, 6.25 mg, oral, BID with meals  cefTRIAXone, 1 g, intravenous, q24h  clopidogrel, 75 mg, oral, Daily  enoxaparin, 40 mg, subcutaneous, q24h  tiotropium, 2 puff, inhalation, Daily   And  fluticasone furoate-vilanteroL, 1 puff, inhalation, Daily  ipratropium-albuteroL, 3 mL, nebulization, TID  melatonin, 3 mg, oral, Nightly  methylPREDNISolone sodium succinate (PF), 40 mg, intravenous, q12h  pantoprazole, 40 mg, oral, Daily before breakfast   Or  pantoprazole, 40 mg, intravenous, Daily before breakfast      Continuous medications     PRN medications  PRN medications: albuterol, bisacodyl, bisacodyl, guaiFENesin, ondansetron **OR** ondansetron  Results for orders placed or performed during the hospital encounter of 03/23/24 (from the past 96 hour(s))   CBC  and Auto Differential   Result Value Ref Range    WBC 11.2 4.4 - 11.3 x10*3/uL    nRBC 0.0 0.0 - 0.0 /100 WBCs    RBC 5.16 4.50 - 5.90 x10*6/uL    Hemoglobin 15.7 13.5 - 17.5 g/dL    Hematocrit 45.3 41.0 - 52.0 %    MCV 88 80 - 100 fL    MCH 30.4 26.0 - 34.0 pg    MCHC 34.7 32.0 - 36.0 g/dL    RDW 13.8 11.5 - 14.5 %    Platelets 211 150 - 450 x10*3/uL    Neutrophils % 78.5 40.0 - 80.0 %    Immature Granulocytes %, Automated 0.3 0.0 - 0.9 %    Lymphocytes % 11.2 13.0 - 44.0 %    Monocytes % 9.6 2.0 - 10.0 %    Eosinophils % 0.2 0.0 - 6.0 %    Basophils % 0.2 0.0 - 2.0 %    Neutrophils Absolute 8.81 (H) 1.20 - 7.70 x10*3/uL    Immature Granulocytes Absolute, Automated 0.03 0.00 - 0.70 x10*3/uL    Lymphocytes Absolute 1.25 1.20 - 4.80 x10*3/uL    Monocytes Absolute 1.08 (H) 0.10 - 1.00 x10*3/uL    Eosinophils Absolute 0.02 0.00 - 0.70 x10*3/uL    Basophils Absolute 0.02 0.00 - 0.10 x10*3/uL   Magnesium   Result Value Ref Range    Magnesium 2.11 1.60 - 2.40 mg/dL   Comprehensive metabolic panel   Result Value Ref Range    Glucose 117 (H) 74 - 99 mg/dL    Sodium 134 (L) 136 - 145 mmol/L    Potassium 4.1 3.5 - 5.3 mmol/L    Chloride 98 98 - 107 mmol/L    Bicarbonate 29 21 - 32 mmol/L    Anion Gap 11 10 - 20 mmol/L    Urea Nitrogen 15 6 - 23 mg/dL    Creatinine 1.12 0.50 - 1.30 mg/dL    eGFR 80 >60 mL/min/1.73m*2    Calcium 8.9 8.6 - 10.3 mg/dL    Albumin 4.2 3.4 - 5.0 g/dL    Alkaline Phosphatase 81 33 - 120 U/L    Total Protein 7.6 6.4 - 8.2 g/dL    AST 13 9 - 39 U/L    Bilirubin, Total 0.8 0.0 - 1.2 mg/dL    ALT 10 10 - 52 U/L   Protime-INR   Result Value Ref Range    Protime 14.2 (H) 9.8 - 12.8 seconds    INR 1.3 (H) 0.9 - 1.1   B-Type Natriuretic Peptide   Result Value Ref Range    BNP 17 0 - 99 pg/mL   Troponin I, High Sensitivity   Result Value Ref Range    Troponin I, High Sensitivity <3 0 - 20 ng/L   Blood Gas, Venous   Result Value Ref Range    POCT pH, Venous 7.45 (H) 7.33 - 7.43 pH    POCT pCO2, Venous 46 41 -  51 mm Hg    POCT pO2, Venous 47 (H) 35 - 45 mm Hg    POCT SO2, Venous 81 (H) 45 - 75 %    POCT Oxy Hemoglobin, Venous 77.9 (H) 45.0 - 75.0 %    POCT Base Excess, Venous 6.9 (H) -2.0 - 3.0 mmol/L    POCT HCO3 Calculated, Venous 32.0 (H) 22.0 - 26.0 mmol/L    Patient Temperature 37.0 degrees Celsius    FiO2 21 %   Blood Culture    Specimen: Peripheral Venipuncture; Blood culture   Result Value Ref Range    Blood Culture Loaded on Instrument - Culture in progress    Blood Culture    Specimen: Peripheral Venipuncture; Blood culture   Result Value Ref Range    Blood Culture Loaded on Instrument - Culture in progress    Lactate   Result Value Ref Range    Lactate 0.9 0.4 - 2.0 mmol/L   CBC   Result Value Ref Range    WBC 10.6 4.4 - 11.3 x10*3/uL    nRBC 0.0 0.0 - 0.0 /100 WBCs    RBC 4.46 (L) 4.50 - 5.90 x10*6/uL    Hemoglobin 13.4 (L) 13.5 - 17.5 g/dL    Hematocrit 39.4 (L) 41.0 - 52.0 %    MCV 88 80 - 100 fL    MCH 30.0 26.0 - 34.0 pg    MCHC 34.0 32.0 - 36.0 g/dL    RDW 13.6 11.5 - 14.5 %    Platelets 192 150 - 450 x10*3/uL   Basic metabolic panel   Result Value Ref Range    Glucose 153 (H) 74 - 99 mg/dL    Sodium 135 (L) 136 - 145 mmol/L    Potassium 4.6 3.5 - 5.3 mmol/L    Chloride 103 98 - 107 mmol/L    Bicarbonate 25 21 - 32 mmol/L    Anion Gap 12 10 - 20 mmol/L    Urea Nitrogen 14 6 - 23 mg/dL    Creatinine 0.97 0.50 - 1.30 mg/dL    eGFR >90 >60 mL/min/1.73m*2    Calcium 7.9 (L) 8.6 - 10.3 mg/dL       Assessment/Plan                  Principal Problem:    COPD exacerbation (CMS/HCC)  Active Problems:    Chronic combined systolic and diastolic heart failure (CMS/HCC)    Coronary artery disease involving native coronary artery of native heart    Essential hypertension, benign    Dyslipidemia    Acute respiratory failure with hypoxia (CMS/HCC)    Tachycardia    Hyperglycemia    Sepsis (CMS/HCC)    1. Acute respiratory failure with hypoxia (CMS/HCC)      continue oxygen, wean as possible      2. COPD exacerbation  (CMS/HCC)      empirical Rocephin zithromax and solumedrol. continue spiriva and duoneb      3. Chronic combined systolic and diastolic heart failure (CMS/HCC)      compensated, continue home meds      4. Coronary artery disease involving native coronary artery of native heart, unspecified whether angina present      contionue risk modifications      5. Essential hypertension, benign      home meds, monitor                      Juanita Mayer MD

## 2024-03-24 NOTE — PROGRESS NOTES
3/24/24 1345   03/24/24 1431   Discharge Planning   Living Arrangements Spouse/significant other   Support Systems Spouse/significant other   Assistance Needed None   Type of Residence Private residence   Number of Stairs to Enter Residence 0   Number of Stairs Within Residence 0   Do you have animals or pets at home? No   Home or Post Acute Services None   Patient expects to be discharged to: Home   Does the patient need discharge transport arranged? No     Spoke with pt. Pt from home and lives in an apartment with spouse. Pt appeared alert and oriented. Pt states being independent and yuliya use of any assistive devices. Pt states still drives and is able to obtain meds and get to appointments. Pt yuliya any home going needs. Pt aware to reach out if needs arise.   Brittany Lo RN, BSN, [employer] Supervisor

## 2024-03-25 VITALS
WEIGHT: 198 LBS | HEART RATE: 64 BPM | TEMPERATURE: 97.9 F | HEIGHT: 73 IN | SYSTOLIC BLOOD PRESSURE: 100 MMHG | RESPIRATION RATE: 14 BRPM | BODY MASS INDEX: 26.24 KG/M2 | OXYGEN SATURATION: 90 % | DIASTOLIC BLOOD PRESSURE: 62 MMHG

## 2024-03-25 LAB
ATRIAL RATE: 103 BPM
P AXIS: 84 DEGREES
PR INTERVAL: 169 MS
Q ONSET: 252 MS
QRS COUNT: 17 BEATS
QRS DURATION: 89 MS
QT INTERVAL: 337 MS
QTC CALCULATION(BAZETT): 441 MS
QTC FREDERICIA: 403 MS
R AXIS: -45 DEGREES
T AXIS: 85 DEGREES
T OFFSET: 420 MS
VENTRICULAR RATE: 103 BPM

## 2024-03-25 PROCEDURE — 99239 HOSP IP/OBS DSCHRG MGMT >30: CPT | Performed by: INTERNAL MEDICINE

## 2024-03-25 PROCEDURE — 2500000002 HC RX 250 W HCPCS SELF ADMINISTERED DRUGS (ALT 637 FOR MEDICARE OP, ALT 636 FOR OP/ED): Performed by: STUDENT IN AN ORGANIZED HEALTH CARE EDUCATION/TRAINING PROGRAM

## 2024-03-25 PROCEDURE — 2500000004 HC RX 250 GENERAL PHARMACY W/ HCPCS (ALT 636 FOR OP/ED): Performed by: STUDENT IN AN ORGANIZED HEALTH CARE EDUCATION/TRAINING PROGRAM

## 2024-03-25 PROCEDURE — 94640 AIRWAY INHALATION TREATMENT: CPT

## 2024-03-25 PROCEDURE — 2500000001 HC RX 250 WO HCPCS SELF ADMINISTERED DRUGS (ALT 637 FOR MEDICARE OP): Performed by: STUDENT IN AN ORGANIZED HEALTH CARE EDUCATION/TRAINING PROGRAM

## 2024-03-25 PROCEDURE — 2500000005 HC RX 250 GENERAL PHARMACY W/O HCPCS: Performed by: INTERNAL MEDICINE

## 2024-03-25 RX ORDER — AZITHROMYCIN 500 MG/1
500 TABLET, FILM COATED ORAL DAILY
Qty: 5 TABLET | Refills: 0 | Status: SHIPPED | OUTPATIENT
Start: 2024-03-25 | End: 2024-03-30

## 2024-03-25 RX ORDER — PREDNISONE 10 MG/1
TABLET ORAL
Qty: 30 TABLET | Refills: 0 | Status: SHIPPED | OUTPATIENT
Start: 2024-03-25 | End: 2024-04-04

## 2024-03-25 RX ADMIN — METHYLPREDNISOLONE SODIUM SUCCINATE 40 MG: 40 INJECTION, POWDER, FOR SOLUTION INTRAMUSCULAR; INTRAVENOUS at 10:10

## 2024-03-25 RX ADMIN — Medication: at 08:15

## 2024-03-25 RX ADMIN — FLUTICASONE FUROATE AND VILANTEROL TRIFENATATE 1 PUFF: 200; 25 POWDER RESPIRATORY (INHALATION) at 10:09

## 2024-03-25 RX ADMIN — CARVEDILOL 6.25 MG: 6.25 TABLET, FILM COATED ORAL at 10:10

## 2024-03-25 RX ADMIN — ASPIRIN 81 MG: 81 TABLET, COATED ORAL at 10:10

## 2024-03-25 RX ADMIN — TIOTROPIUM BROMIDE INHALATION SPRAY 2 PUFF: 3.12 SPRAY, METERED RESPIRATORY (INHALATION) at 10:09

## 2024-03-25 RX ADMIN — IPRATROPIUM BROMIDE AND ALBUTEROL SULFATE 3 ML: 2.5; .5 SOLUTION RESPIRATORY (INHALATION) at 07:45

## 2024-03-25 RX ADMIN — PANTOPRAZOLE SODIUM 40 MG: 40 TABLET, DELAYED RELEASE ORAL at 05:03

## 2024-03-25 RX ADMIN — CLOPIDOGREL 75 MG: 75 TABLET ORAL at 10:10

## 2024-03-25 RX ADMIN — IPRATROPIUM BROMIDE AND ALBUTEROL SULFATE 3 ML: 2.5; .5 SOLUTION RESPIRATORY (INHALATION) at 11:39

## 2024-03-25 ASSESSMENT — PAIN - FUNCTIONAL ASSESSMENT: PAIN_FUNCTIONAL_ASSESSMENT: 0-10

## 2024-03-25 ASSESSMENT — COGNITIVE AND FUNCTIONAL STATUS - GENERAL
MOBILITY SCORE: 24
DAILY ACTIVITIY SCORE: 24

## 2024-03-25 ASSESSMENT — PAIN SCALES - GENERAL: PAINLEVEL_OUTOF10: 0 - NO PAIN

## 2024-03-25 NOTE — NURSING NOTE
Patient and wife given discharge instructions, verbalized understanding. Patient is discharging home with wife.    Universal Safety Interventions

## 2024-03-25 NOTE — CARE PLAN
Problem: Skin  Goal: Prevent/manage excess moisture  Outcome: Progressing  Goal: Prevent/minimize sheer/friction injuries  Outcome: Progressing     Problem: Pain  Goal: Takes deep breaths with improved pain control throughout the shift  Outcome: Progressing  Goal: Turns in bed with improved pain control throughout the shift  Outcome: Progressing  Goal: Walks with improved pain control throughout the shift  Outcome: Progressing  Goal: Performs ADL's with improved pain control throughout shift  Outcome: Progressing  Goal: Participates in PT with improved pain control throughout the shift  Outcome: Progressing  Goal: Free from opioid side effects throughout the shift  Outcome: Progressing  Goal: Free from acute confusion related to pain meds throughout the shift  Outcome: Progressing     Problem: Respiratory  Goal: Clear secretions with interventions this shift  Outcome: Progressing  Goal: Minimize anxiety/maximize coping throughout shift  Outcome: Progressing  Goal: Minimal/no exertional discomfort or dyspnea this shift  Outcome: Progressing  Goal: No signs of respiratory distress (eg. Use of accessory muscles. Peds grunting)  Outcome: Progressing  Goal: Patent airway maintained this shift  Outcome: Progressing  Goal: Tolerate mechanical ventilation evidenced by VS/agitation level this shift  Outcome: Progressing  Goal: Tolerate pulmonary toileting this shift  Outcome: Progressing  Goal: Verbalize decreased shortness of breath this shift  Outcome: Progressing  Goal: Wean oxygen to maintain O2 saturation per order/standard this shift  Outcome: Progressing  Goal: Increase self care and/or family involvement in next 24 hours  Outcome: Progressing     Problem: Pain - Adult  Goal: Verbalizes/displays adequate comfort level or baseline comfort level  Outcome: Progressing     Problem: Safety - Adult  Goal: Free from fall injury  Outcome: Progressing     Problem: Discharge Planning  Goal: Discharge to home or other facility  with appropriate resources  Outcome: Progressing     Problem: Chronic Conditions and Co-morbidities  Goal: Patient's chronic conditions and co-morbidity symptoms are monitored and maintained or improved  Outcome: Progressing     Problem: Heart Failure  Goal: Improved gas exchange this shift  Outcome: Progressing  Goal: Improved urinary output this shift  Outcome: Progressing  Goal: Reduction in peripheral edema within 24 hours  Outcome: Progressing  Goal: Report improvement of dyspnea/breathlessness this shift  Outcome: Progressing  Goal: Weight from fluid excess reduced over 2-3 days, then stabilize  Outcome: Progressing  Goal: Increase self care and/or family involvement in 24 hours  Outcome: Progressing

## 2024-03-25 NOTE — CARE PLAN
The patient's goals for the shift include      The clinical goals for the shift include Patient will remain safe this shift

## 2024-03-27 LAB
BACTERIA BLD CULT: NORMAL
BACTERIA BLD CULT: NORMAL

## 2024-03-27 NOTE — SIGNIFICANT EVENT
COPD Follow Up Call    Patient Reports Feelings (Symptoms) are: doing good    The Patient discharged With the following Diagnosis: COPD    If You were Referred to Pulmonary Rehab, Have You Followed up With them?    How is your breathing? It is good     How is your activity? Back to NL    How is your cough? Minimal - here and there    Mucus: not much    How often Are you using a Quick Relief/ Rescue Inhaler / Nebulizer:    as scheduled

## 2024-03-30 NOTE — DISCHARGE SUMMARY
Discharge Diagnosis  Acute hypoxic respiratory failure secondary to COPD exacerbation  Chronic combined systolic and diastolic heart failure  Coronary artery disease  Essential hypertension    Issues Requiring Follow-Up  Follow with PCP in 1 week's time    Discharge Meds     Your medication list        START taking these medications        Instructions Last Dose Given Next Dose Due   azithromycin 500 mg tablet  Commonly known as: Zithromax      Take 1 tablet (500 mg) by mouth once daily for 5 days.       predniSONE 10 mg tablet  Commonly known as: Deltasone  Start taking on: March 25, 2024      Take 5 tablets (50 mg) by mouth once daily for 2 days, THEN 4 tablets (40 mg) once daily for 2 days, THEN 3 tablets (30 mg) once daily for 2 days, THEN 2 tablets (20 mg) once daily for 2 days, THEN 1 tablet (10 mg) once daily for 2 days.              CONTINUE taking these medications        Instructions Last Dose Given Next Dose Due   albuterol 90 mcg/actuation inhaler      Inhale 2 puffs every 4 hours if needed for wheezing.       aspirin 81 mg EC tablet      Take 1 tablet by mouth once daily       atorvastatin 80 mg tablet  Commonly known as: Lipitor      Take 1 tablet (80 mg) by mouth once daily.       Breztri Aerosphere 160-9-4.8 mcg/actuation HFA aerosol inhaler  Generic drug: budesonide-glycopyr-formoterol      Inhale 2 puffs 2 times a day.       carvedilol 6.25 mg tablet  Commonly known as: Coreg           clopidogrel 75 mg tablet  Commonly known as: Plavix           Entresto 24-26 mg tablet  Generic drug: sacubitriL-valsartan      Take 1 tablet by mouth twice daily       ezetimibe 10 mg tablet  Commonly known as: Zetia      Take 1 tablet by mouth once daily       spironolactone 25 mg tablet  Commonly known as: Aldactone      Take 1 tablet by mouth once daily                 Where to Get Your Medications        These medications were sent to [facility] [address] [place] [address]  [facility] [id number]      Phone: [phone]   azithromycin 500 mg tablet  predniSONE 10 mg tablet         Test Results Pending At Discharge  Pending Labs       No current pending labs.            Hospital Course  Simón Rajput is a 53 y.o. male with PMHx s/f COPD, HTN, HlD, CAD s/p 3 vessel CABG 4/23 presenting with shortness of breath and cough for the past month. He is a longtime smoker of 0.5 ppd X 30 years (at least) and continues to smoke. Complains of worsening cough and some mild sputum production along with shortness of breath. 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 Admits to some URI symptoms including sinus congestion as well. No chest pain, nausea, vomiting, fever, chills, lightheadedness, abdominal pain, or leg swelling.     ED Course (Summary):   Vitals on presentation: 97.8 F, 114 bpm, 18 rr, 110/63, 90 on RA  Labs: CMP Na 134, BNP 17, trop <3, INR 1.3,   CBC WBC 11.2, Hg 15.7 Plt 211  VBG pH 7.45, pCO2 46, pO2 47  Imaging: CXR - trace left pleural effusion. Agree with interpretation.  CTA chest - no aortic dissection or acute PE. Atypical tree-in-bud infiltrates in the R upper and R lower lobes.  RUL opacity 1.3 cm. Recommend CT post-therapy to exclude mass.  Interventions: azithromycin, duoneb X1, Mag sulfate 2 g, NaCl 500 mL bolus. Admission for further treatment.        3/25: No acute events overnight.  Patient will be discharged home today.  See full discharge orders.  Patient was follow-up PCP in 1 week's time.    Pertinent Physical Exam At Time of Discharge  Physical Exam  CHEST - clear to auscultation, no wheezing, no crackles and no rales, good effort  CARDIAC - regular rate and regular rhythm, no murmur  ABDOMEN - no organomegaly, soft, nontender, nondistended, normal bowel sounds, no guarding/rebound/rigidity  EXTREMITIES - no edema, no deformities  NEUROLOGICAL - alert, oriented x3 and no acute focal signs  PSYCHIATRIC - alert, pleasant  and cordial, age-appropriate    Outpatient Follow-Up  Future Appointments   Date Time Provider Department Center   5/17/2024  9:40 AM JORGE Moore-CNP [id number]   8/16/2024 11:30 AM Govind Watts DO [id number]     Discharge planning took more than 30 minutes    Matt East MD

## 2024-04-05 NOTE — DOCUMENTATION CLARIFICATION NOTE
"    PATIENT:               RALPH REECE  ACCT #:                  [id number]  MRN:                       [id number]  :                       1970  ADMIT DATE:       3/23/2024 1:20 PM  DISCH DATE:        3/25/2024 12:42 PM  RESPONDING PROVIDER #:        [id number]          PROVIDER RESPONSE TEXT:    Sepsis was a differential diagnosis and ruled out after study    CDI QUERY TEXT:    [facility]_CV Sepsis    Instruction:  Based on your assessment of the patient and the clinical information, please provide the requested documentation by clicking on the appropriate radio button and enter any additional information if prompted.    Question: Sepsis was documented in the medical record. Based on the documentation and the clinical information, can the diagnosis be further clarified as    When answering this query, please exercise your independent professional judgment. The fact that a question is being asked, does not imply that any particular answer is desired or expected.    The patient's clinical indicators include:  Clinical Information: 52 y/o M admitted with COPD exacerbation    Documented Diagnosis: \"sepsis\" documented in H/P note on 3/23/24 by Dr. Walker and \"sepsis\" documented in MD PN on 3/24/24 by Dr. Mayer    Clinical Indicators 3/23-3/25:  VS on admit: T 97.8, P 114, R 18, /63  Temp: 97.8-98.3  HR:   Respirs: 14-21  CXR 3/23: Trace left pleural effusion.  CR: 1.12, 0.97  bilirubin: 0.8  lactate: 0.9  wbc: 11.2, 10.6  platelet: 211, 192  neutrophil percent: 78.5  absolute neutrophil: 8.81  BC (-)    ED note 3/23 DR. Lejeune:  \"presents with shortness of breath and nonproductive cough.  Ongoing for the past few weeks but got worse over the past day or 2.  Acute respiratory failure with hypoxia, COPD exacerbation\"    H/P 3/23 Dr. Walker: \"COPD exacerbation with sepsis.  Due to patient's resting O2 sats of 88% patient will be admitted for respiratory failure and COPD exacerbation.  He was given azithromycin help " "cover for infectious process given longevity of symptoms and also help with inflammation\"    MD PN 3/24 Dr. Mayer: \"Acute respiratory failure with hypoxia, COPD exacerbation, sepsis, tachycardia\"    D/C summary 3/25 Dr. East: \"Acute hypoxic respiratory failure secondary to COPD exacerbation.\"    Treatment: daily labs, frequent monitoring of vital signs, CXR, supplemental oxygen, 500cc NS bolus, NS at 125ml/hr, IV zithromax, IV rocephin,    Risk Factors: acute hypoxic respiratory failure  Options provided:  -- Sepsis was a differential diagnosis and ruled out after study  -- Sepsis 2/22 ##please specify infection source with other organ dysfunction  -- Other - I will add my own diagnosis  -- Refer to Clinical Documentation Reviewer    Query created by: Karen Marquez on 3/29/2024 7:26 AM      Electronically signed by:  ANDRE EAST MD 4/4/2024 9:21 PM          "

## 2024-04-22 DIAGNOSIS — E78.5 HYPERLIPIDEMIA, UNSPECIFIED HYPERLIPIDEMIA TYPE: ICD-10-CM

## 2024-04-22 DIAGNOSIS — I25.10 CORONARY ARTERY DISEASE INVOLVING NATIVE CORONARY ARTERY OF NATIVE HEART, UNSPECIFIED WHETHER ANGINA PRESENT: ICD-10-CM

## 2024-04-22 RX ORDER — EZETIMIBE 10 MG/1
10 TABLET ORAL DAILY
Qty: 90 TABLET | Refills: 3 | Status: SHIPPED | OUTPATIENT
Start: 2024-04-22

## 2024-05-17 ENCOUNTER — APPOINTMENT (OUTPATIENT)
Dept: PULMONOLOGY | Facility: HOSPITAL | Age: 54
End: 2024-05-17
Payer: COMMERCIAL

## 2024-05-23 ENCOUNTER — OFFICE VISIT (OUTPATIENT)
Dept: PULMONOLOGY | Facility: HOSPITAL | Age: 54
End: 2024-05-23
Payer: COMMERCIAL

## 2024-05-23 VITALS
HEIGHT: 72 IN | RESPIRATION RATE: 16 BRPM | HEART RATE: 78 BPM | WEIGHT: 203.4 LBS | BODY MASS INDEX: 27.55 KG/M2 | DIASTOLIC BLOOD PRESSURE: 99 MMHG | SYSTOLIC BLOOD PRESSURE: 139 MMHG | OXYGEN SATURATION: 95 %

## 2024-05-23 DIAGNOSIS — F17.210 CIGARETTE SMOKER: ICD-10-CM

## 2024-05-23 DIAGNOSIS — R93.89 ABNORMAL CT OF THE CHEST: ICD-10-CM

## 2024-05-23 DIAGNOSIS — J44.9 CHRONIC OBSTRUCTIVE PULMONARY DISEASE, UNSPECIFIED COPD TYPE (MULTI): Primary | ICD-10-CM

## 2024-05-23 DIAGNOSIS — J30.9 ALLERGIC RHINITIS, UNSPECIFIED SEASONALITY, UNSPECIFIED TRIGGER: ICD-10-CM

## 2024-05-23 DIAGNOSIS — J45.909 ASTHMA, UNSPECIFIED ASTHMA SEVERITY, UNSPECIFIED WHETHER COMPLICATED, UNSPECIFIED WHETHER PERSISTENT (HHS-HCC): ICD-10-CM

## 2024-05-23 PROCEDURE — 99213 OFFICE O/P EST LOW 20 MIN: CPT | Performed by: NURSE PRACTITIONER

## 2024-05-23 ASSESSMENT — ENCOUNTER SYMPTOMS
WHEEZING: 1
RHINORRHEA: 0
UNEXPECTED WEIGHT CHANGE: 0
CHILLS: 0
SHORTNESS OF BREATH: 1
FEVER: 0
COUGH: 1
FATIGUE: 0

## 2024-05-23 NOTE — PROGRESS NOTES
Subjective   Patient ID: Simón Rajput is a 53 y.o. male who presents for COPD/asthma followu p.     HPI: Patient has PMH of CAD s/p CABG, HTN, HLD, COPD, nicotine dependence, and chronic systolic heart failure with EF 45%. He was referred for COPD management. He states that he gets shortness of breath on exertion, productive cough, and will hear wheezing. He does get seasonal allergies also. He denies any known history of asthma. He has been on Trelegy in the past and felt that this did not help as much as Spiriva respimat did. He is not currently on a maintenance inhaler. He is a current smoker at 1/2 ppd he mostly smoked 1-2.5 ppd x 30 years.     Today he is here for follow up. He states that his breathing has been improved on Breztri. He did have a hospitalization for COPD exacerbation in March. He is still smoking at 1 ppd. He has no other concerns.     Review of Systems   Constitutional:  Negative for chills, fatigue, fever and unexpected weight change.   HENT:  Positive for congestion. Negative for postnasal drip and rhinorrhea.    Respiratory:  Positive for cough (denies hemoptysis.), shortness of breath and wheezing.    Cardiovascular:  Negative for chest pain and leg swelling.   All other systems reviewed and are negative.      Objective   Physical Exam  Vitals reviewed.   Constitutional:       Appearance: Normal appearance.   HENT:      Head: Normocephalic.   Cardiovascular:      Rate and Rhythm: Normal rate and regular rhythm.   Pulmonary:      Effort: Pulmonary effort is normal.      Breath sounds: Wheezing present.   Skin:     General: Skin is warm and dry.   Neurological:      Mental Status: He is alert.         Assessment/Plan   COPD/asthma overlap  Cigarette smoker   Allergic rhinitis     Plan:    -PFTs done 2/2023 with FEV1/FVC 42, FEV1 31-41, , moderately reduced DLCO.   -Continue Breztri 2 puffs BID.   -Continue albuterol prn.   -He is a current smoker at 1 ppd but mostly smoked 1-2.5  ppd x 30 years. Encouraged complete smoking cessation.   -LDCT was not done but he did have a CT chest done during hospitalization in March which showed atypical tree in bud opacities in the RUL and RLL, also a wedge shaped opacity measuring 1.3 cm and recommends a follow up CT chest. I ordered this for a 3 month follow up to be done in mid/end of June.   -IGE, RAST ordered. Eos elevated at 810.    Overall we will continue current regiment and get CT chest for follow up in June. I will bring him back with me in 3 months. I instructed patient to call sooner if needed.

## 2024-05-23 NOTE — PATIENT INSTRUCTIONS
Continue Breztri 2 puffs twice a day, everyday.   Continue albuterol as needed.   Please get CT scan of your chest done for follow up end of June.   Call with any questions or concerns.  Follow up with me in 3 months.

## 2024-07-24 DIAGNOSIS — I50.42 CHRONIC COMBINED SYSTOLIC AND DIASTOLIC HEART FAILURE (MULTI): Primary | ICD-10-CM

## 2024-07-24 DIAGNOSIS — I10 ESSENTIAL HYPERTENSION, BENIGN: ICD-10-CM

## 2024-07-24 RX ORDER — LOSARTAN POTASSIUM 25 MG/1
25 TABLET ORAL DAILY
Qty: 90 TABLET | Refills: 1 | Status: SHIPPED | OUTPATIENT
Start: 2024-07-24 | End: 2025-07-24

## 2024-07-26 DIAGNOSIS — I50.42 CHRONIC COMBINED SYSTOLIC AND DIASTOLIC CONGESTIVE HEART FAILURE (MULTI): ICD-10-CM

## 2024-07-27 RX ORDER — SPIRONOLACTONE 25 MG/1
25 TABLET ORAL DAILY
Qty: 90 TABLET | Refills: 0 | Status: SHIPPED | OUTPATIENT
Start: 2024-07-27

## 2024-08-16 ENCOUNTER — APPOINTMENT (OUTPATIENT)
Dept: CARDIOLOGY | Facility: CLINIC | Age: 54
End: 2024-08-16
Payer: COMMERCIAL

## 2024-08-22 ENCOUNTER — APPOINTMENT (OUTPATIENT)
Dept: PULMONOLOGY | Facility: HOSPITAL | Age: 54
End: 2024-08-22
Payer: COMMERCIAL

## 2024-10-13 DIAGNOSIS — E78.5 HYPERLIPIDEMIA, UNSPECIFIED HYPERLIPIDEMIA TYPE: ICD-10-CM

## 2024-10-22 DIAGNOSIS — I50.42 CHRONIC COMBINED SYSTOLIC AND DIASTOLIC CONGESTIVE HEART FAILURE: ICD-10-CM

## 2024-10-29 RX ORDER — SPIRONOLACTONE 25 MG/1
25 TABLET ORAL DAILY
Qty: 90 TABLET | Refills: 0 | Status: SHIPPED | OUTPATIENT
Start: 2024-10-29

## 2024-10-30 RX ORDER — ATORVASTATIN CALCIUM 80 MG/1
80 TABLET, FILM COATED ORAL DAILY
Qty: 90 TABLET | Refills: 0 | Status: SHIPPED | OUTPATIENT
Start: 2024-10-30 | End: 2024-11-01 | Stop reason: SDUPTHER

## 2024-11-01 DIAGNOSIS — E78.5 HYPERLIPIDEMIA, UNSPECIFIED HYPERLIPIDEMIA TYPE: ICD-10-CM

## 2024-11-02 RX ORDER — ATORVASTATIN CALCIUM 80 MG/1
80 TABLET, FILM COATED ORAL DAILY
Qty: 90 TABLET | Refills: 0 | Status: SHIPPED | OUTPATIENT
Start: 2024-11-02

## 2024-11-07 PROBLEM — J44.9 COPD (CHRONIC OBSTRUCTIVE PULMONARY DISEASE) (MULTI): Status: ACTIVE | Noted: 2024-11-07

## 2024-11-08 NOTE — PROGRESS NOTES
[facility] Heart and Vascular Cardiology    Patient Name: Simón Rajput  Patient : 1970      Scribe Attestation  By signing my name below, IValerie Scribe   attest that this documentation has been prepared under the direction and in the presence of Govind Watts DO.      Reason for visit:  This is a 54-year-old male here for follow-up regarding chronic systolic and diastolic heart failure with an ejection fraction of 45%, coronary artery disease status post bypass done in 2023, hypertension, dyslipidemia, and COPD/history of tobacco use.     HPI:  This is a 54-year-old male here for follow-up regarding chronic systolic and diastolic heart failure with an ejection fraction of 45%, coronary artery disease status post bypass done in 2023, hypertension, dyslipidemia, and COPD/history of tobacco use.  The patient was last evaluated by me in 2024.  At that visit I referred him to pulmonology, ordered blood work including CMP/lipid/magnesium/CBC/BNP, and asked that he follow-up in 6 months.  Patient was subsequently seen in the emergency department in 2024 with a COPD exacerbation.  Patient was last seen by pulmonology in May 2024.  BMP done in 2024 showed serum sodium of 135, serum potassium of 4.6, serum creatinine 0.97, CBC showed a hemoglobin of 13.4. ECG done today showed sinus rhythm with a heart rate of 93 bpm. The patient reports that he has been feeling generally well from the cardiac standpoint. He denies any new chest pain, shortness of breath, palpitations and lightheadedness. He states that he takes all of his medications as prescribed. During my exam, he was resting comfortably on the exam table.            Assessment/Plan:   1. Chronic systolic and diastolic heart failure   The patient has a history of chronic systolic and diastolic heart failure with an ejection fraction of 45%.  Echocardiogram done on 23 mildly decreased left  ventricular systolic function with an ejection fraction of 45%. Abnormal septal motion consistent with post-operative status. There is low normal right ventricular systolic function. Poorly visualized anatomical structures due to suboptimal image quality.  He does not appear significantly volume overloaded on exam today.  He should continue his current cardiac medications.  Echocardiogram and lab works were ordered as noted below.   Lab works as noted below will be done in 6 months prior to his next visit.   I discussed with him the importance of following a low-sodium heart healthy diet.   Follow up in 6 months and sooner if necessary.      2. Coronary artery disease  The patient has a history coronary artery disease status post bypass done in April 2023.  ECG done today showed sinus rhythm with a heart rate of 93 bpm.   He denies anginal chest discomfort.  Blood pressure appears controlled on exam today.  He should continue current antihypertensive medications and antiplatelet therapy.  Echocardiogram done on 4/26/23 mildly decreased left ventricular systolic function with an ejection fraction of 45%. Abnormal septal motion consistent with post-operative status. There is low normal right ventricular systolic function. Poorly visualized anatomical structures due to suboptimal image quality.  Recent lab works as noted in the HPI.  Lipid panel done in August 2023 showed an LDL of 79 and triglycerides of 153 while on atorvastatin 80 mg daily.   He is currently on atorvastatin 80 mg daily and  Zetia 10 mg daily.   Echocardiogram and lab works were ordered as noted below.   Lab works as noted below will be done in 6 months prior to his next visit.   Please see lifestyle recommendations below.  Follow up in 6 months and sooner if necessary.      3. Hypertension  The patient has a history of hypertension which appears controlled on exam today.  He should continue his current antihypertensive medications and monitor his  blood pressure at home.      4. Dyslipidemia  Lipid panel done in August 2023 showed an LDL of 79 and triglycerides of 153 while on atorvastatin 80 mg daily.   He is currently on atorvastatin 80 mg daily and  Zetia 10 mg daily.   Lipid panel was ordered as noted below.   Please see lifestyle recommendations below.     5. COPD/History of tobacco use  The patient is a former smoker.  I discussed with him the importance of continued smoking cessation.  He should continue to follow with Pulmonology for management of COPD.         Orders:   CMP/lipid/magnesium/CBC/BNP,   Echocardiogram,   BMP/BNP/magnesium in 6 months,   Follow-up in 6 months.    Lifestyle Recommendations  I recommend a whole-food plant-based diet, an eating pattern that encourages the consumption of unrefined plant foods (such as fruits, vegetables, tubers, whole grains, legumes, nuts and seeds) and discourages meats, dairy products, eggs and processed foods.     The AHA/ACC recommends that the patient consume a dietary pattern that emphasizes intake of vegetables, fruits, and whole grains; includes low-fat dairy products, poultry, fish, legumes, non-tropical vegetable oils, and nuts; and limits intake of sodium, sweets, sugar-sweetened beverages, and red meats.  Adapt this dietary pattern to appropriate calorie requirements (a 500-750 kcal/day deficit to loose weight), personal and cultural food preferences, and nutrition therapy for other medical conditions (including diabetes).  Achieve this pattern by following plans such as the [place] Mediterranean, DASH dietary pattern, or AHA diet.     Engage in 2 hours and 30 minutes per week of moderate-intensity physical activity, or 1 hour and 15 minutes (75 minutes) per week of vigorous-intensity aerobic physical activity, or an equivalent combination of moderate and vigorous-intensity aerobic physical activity. Aerobic activity should be performed in episodes of at least 10 minutes preferably spread  throughout the week.     Adhering to a heart healthy diet, regular exercise habits, avoidance of tobacco products, and maintenance of a healthy weight are crucial components of their heart disease risk reduction.     Any positive review of systems not specifically addressed in the office visit today should be evaluated and treated by the patients primary care physician or in an emergency department if necessary     Patient was notified that results from ordered tests will be called to the patient if it changes current management; it will otherwise be discussed at a future appointment and available on [facility].     Thank you for allowing me to participate in the care of this patient.        This document was generated using the assistance of voice recognition software. If there are any errors of spelling, grammar, syntax, or meaning; please feel free to contact me directly for clarification.    Past Medical History:  He has no past medical history on file.    Past Surgical History:  He has no past surgical history on file.      Social History:  He reports that he has been smoking cigarettes. He does not have any smokeless tobacco history on file. No history on file for alcohol use and drug use.    Family History:  No family history on file.     Allergies:  Patient has no known allergies.    Outpatient Medications:  Current Outpatient Medications   Medication Instructions    albuterol 90 mcg/actuation inhaler 2 puffs, inhalation, Every 4 hours PRN    aspirin 81 mg, oral, Daily    atorvastatin (LIPITOR) 80 mg, oral, Daily    budesonide-glycopyr-formoterol (Breztri Aerosphere) 160-9-4.8 mcg/actuation HFA aerosol inhaler 2 puffs, inhalation, 2 times daily RT    carvedilol (COREG) 6.25 mg, oral, 2 times daily (morning and late afternoon)    clopidogrel (PLAVIX) 75 mg, oral, Daily    ezetimibe (ZETIA) 10 mg, oral, Daily    losartan (COZAAR) 25 mg, oral, Daily    spironolactone (ALDACTONE) 25 mg, oral, Daily         ROS:  A 14 point review of systems was done and is negative other than as stated in HPI    Vitals:      3/24/2024    12:58 PM 3/24/2024     5:10 PM 3/24/2024     8:57 PM 3/25/2024    12:49 AM 3/25/2024     5:12 AM 3/25/2024     9:00 AM 5/23/2024    11:46 AM   Vitals   Systolic 113 104 116 109 127 100 139   Diastolic 63 62 51 62 75 62 99   Heart Rate 66 58 61 56 71 64 78   Temp 36.7 °C (98.1 °F) 36.8 °C (98.3 °F) 36.8 °C (98.2 °F) 36.8 °C (98.3 °F) 36.7 °C (98.1 °F) 36.6 °C (97.9 °F)    Resp 16 16 16 16 16 14 16   Height (in)       1.829 m (6')   Weight (lb)       203.4   BMI       27.59 kg/m2   BSA (m2)       2.17 m2        Physical Exam:   Constitutional: Cooperative, in no acute distress, alert, appears stated age.   Skin: Skin color, texture, turgor normal. No rashes or lesions.   Head: Normocephalic. No masses, lesions, tenderness or abnormalities   Eyes: Extraocular movements are grossly intact.   Mouth and throat: Mucous membranes moist   Neck: Neck supple, no carotid bruits, no JVD   Respiratory: Lungs clear to auscultation, no wheezing or rhonchi, no use of accessory muscles   Chest wall: Sternal scar, normal excursion with respiration   Cardiovascular: Regular rhythm, no murmur  Gastrointestinal: Abdomen soft, nontender. Bowel sounds normal.   Musculoskeletal: Strength equal in upper extremities   Extremities: No pitting edema   Neurologic: Sensation grossly intact, alert and oriented x3        Intake/Output:   No intake/output data recorded.    Outpatient Medications  Current Outpatient Medications on File Prior to Visit   Medication Sig Dispense Refill    albuterol 90 mcg/actuation inhaler Inhale 2 puffs every 4 hours if needed for wheezing. 18 g 11    aspirin 81 mg EC tablet Take 1 tablet by mouth once daily 90 tablet 3    atorvastatin (Lipitor) 80 mg tablet Take 1 tablet (80 mg) by mouth once daily. 90 tablet 0    budesonide-glycopyr-formoterol (Breztri Aerosphere) 160-9-4.8 mcg/actuation HFA aerosol  inhaler Inhale 2 puffs 2 times a day. 10.7 g 11    carvedilol (Coreg) 6.25 mg tablet Take 1 tablet (6.25 mg) by mouth 2 times daily (morning and late afternoon).      clopidogrel (Plavix) 75 mg tablet Take 1 tablet (75 mg) by mouth once daily.      ezetimibe (Zetia) 10 mg tablet Take 1 tablet (10 mg) by mouth once daily. 90 tablet 3    losartan (Cozaar) 25 mg tablet Take 1 tablet (25 mg) by mouth once daily. 90 tablet 1    spironolactone (Aldactone) 25 mg tablet Take 1 tablet by mouth once daily 90 tablet 0    [DISCONTINUED] atorvastatin (Lipitor) 80 mg tablet Take 1 tablet by mouth once daily 90 tablet 0     No current facility-administered medications on file prior to visit.       Labs: (past 26 weeks)  No results found for this or any previous visit (from the past 26 weeks).    ECG  No results found for this or any previous visit (from the past 4464 hours).    Echocardiogram  No results found for this or any previous visit from the past 1095 days.      CV Studies:  EKG: No results found for this or any previous visit (from the past 4464 hours).  Echocardiogram:   Echocardiogram     [facility], [address] [place] [address]  Tel [phone] and Fax [phone]    TRANSTHORACIC ECHOCARDIOGRAM REPORT      Patient Name:     RALPH Colon Physician:  [id number] Ventura Huerta MD  Study Date:       4/26/2023          Referring           ALCIRA FUENTES  Physician:  MRN/PID:          [id number]           PCP:  Accession/Order#: [id number]          [facility]  Location[facility]           [facility]  YOB: 1970          Fellow:  Gender:           M                  Nurse:              Felicita Hodgson RN  Admit Date:       4/17/2023          Sonographer:        Lloyd Abbott  Cardiac Sonographer  Intern  Admission Status: Inpatient -        Additional Staff:   Tabatha Mix [facility]  Routine  Height:           182.88 cm          CC Report to:        Kathi T3 [facility]  Weight:           80.29 kg           Study Type:         Echocardiogram  BSA:              2.02 m2  Blood Pressure: 119 /65 mmHg    Diagnosis/ICD: Z95.1-Presence of aortocoronary bypass graft  Indication:    postop CABG  Procedure/CPT: Echo Complete w Full Doppler-[id number]    Patient History:  Pertinent History: Dyspnea. PMH, HTN, HLD, Smoker, COPD, HF (35-40%), s/p CABG  x3 (4/17/23).    Study Detail: The following Echo studies were performed: M-Mode, 2D, Doppler and  color flow. Technically challenging study due to poor acoustic  windows, prominent lung artifact and patient lying in supine  position. Definity used as a contrast agent for endocardial border  definition. Total contrast used for this procedure was 1.0 mL via  IV push.      PHYSICIAN INTERPRETATION:  Left Ventricle: The left ventricular systolic function is mildly decreased, with an estimated ejection fraction of 45%. The left ventricular cavity size is mildly dilated. Abnormal (paradoxical) septal motion consistent with post-operative status. Left ventricular diastolic filling was indeterminate.  Left Atrium: The left atrium is upper limits of normal in size.  Right Ventricle: The right ventricle is normal in size. There is low normal right ventricular systolic function. RV not well visualized.  Right Atrium: The right atrium is upper limits of normal in size.  Aortic Valve: The aortic valve is probably trileaflet. There is trivial aortic valve regurgitation. The peak instantaneous gradient of the aortic valve is 4.2 mmHg.  Mitral Valve: The mitral valve is normal in structure. There is mild mitral annular calcification. There is trace mitral valve regurgitation.  Tricuspid Valve: The tricuspid valve is structurally normal. There is trace tricuspid regurgitation.  Pulmonic Valve: The pulmonic valve is not well visualized. There is trace pulmonic valve regurgitation.  Pericardium: There is a trivial pericardial effusion.  Aorta:  The aortic root was not well visualized. There is no dilatation of the aortic root.  Systemic Veins: The inferior vena cava appears to be of normal size. There is IVC inspiratory collapse greater than 50%.      CONCLUSIONS:  1. Poorly visualized anatomical structures due to suboptimal image quality.  2. Left ventricular systolic function is mildly decreased with a 45% estimated ejection fraction.  3. Abnormal septal motion consistent with post-operative status.  4. There is low normal right ventricular systolic function.    QUANTITATIVE DATA SUMMARY:  2D MEASUREMENTS:  Normal Ranges:  Ao Root d:     3.60 cm    (2.0-3.7cm)  LAs:           3.50 cm    (2.7-4.0cm)  IVSd:          1.00 cm    (0.6-1.1cm)  LVPWd:         1.00 cm    (0.6-1.1cm)  LVIDd:         5.70 cm    (3.9-5.9cm)  LVIDs:         4.20 cm  LV Mass Index: 111.9 g/m2  LV % FS        26.3 %    AORTA MEASUREMENTS:  Normal Ranges:  Ao Sinus, d: 3.50 cm (2.1-3.5cm)    LV DIASTOLIC FUNCTION:  Normal Ranges:  MV Peak E:    0.56 m/s    (0.7-1.2 m/s)  MV Peak A:    0.53 m/s    (0.42-0.7 m/s)  E/A Ratio:    1.06        (1.0-2.2)  MV e'         0.08 m/s    (>8.0)  MV lateral e' 0.08 m/s  MV medial e'  0.09 m/s  MV A Dur:     127.00 msec  E/e' Ratio:   6.62        (<8.0)  MV DT:        222 msec    (150-240 msec)    MITRAL VALVE:  Normal Ranges:  MV DT: 222 msec (150-240msec)    AORTIC VALVE:  Normal Ranges:  AoV Vmax:      1.02 m/s (<=1.7m/s)  AoV Peak P.2 mmHg (<20mmHg)  LVOT Max Elvis:  0.82 m/s (<=1.1m/s)  LVOT VTI:      15.10 cm  LVOT Diameter: 2.10 cm  (1.8-2.4cm)  AoV Area,Vmax: 2.80 cm2 (2.5-4.5cm2)    RIGHT VENTRICLE:  RV s' 0.08 m/s    TRICUSPID VALVE/RVSP:  Normal Ranges:  IVC Diam: 1.40 cm    PULMONIC VALVE:  Normal Ranges:  PV Accel Time: 107 msec (>120ms)  PV Max Elvis:    1.0 m/s  (0.6-0.9m/s)  PV Max P.2 mmHg      [id number] Ventura Huerta MD  Electronically signed on 2023 at 11:09:16 AM         Final     Stress Testing XIOMARAOLIVE(YFJ8858:1:1825):  No results found for this or any previous visit from the past 1825 days.    Cardiac Catheterization:   Adult Cath     [facility], Cath Lab  [address] [place] [address]  Phone [phone] Fax [phone]    Cardiovascular Catheterization Report    Patient Name:     RALPH REECE Performing Physician: [id number] Salvatore Love MD  Study Date:       1/12/2023      Verifying Physician:  [id number] Salvatore Love MD  MRN/PID:          [id number]       Cardiologist:  Accession/Order#: [id number]      Referring Physician:  JIMMY FATIMA  YOB: 1970      Referring Physician:  Gender:           M              Referring Physician:      Study: Left Heart Catheterization      Indications:  RALPH REECE is a 53 year old male who presents with dyslipidemia, hypertension, chronic pulmonary disease and tobacco Use - current. Left ventricular dysfunction and suspected coronary artery disease, with an asymptomatic chest pain assessment. Study performed as an urgent cath procedure.    Medical History:  Stress test performed: No. CTA performed: No. [facility]: No. LVEF Assessed: Yes. LVEF = 35-40%%.    Procedure Description:  After infiltration with 2% Lidocaine, the right radial artery was cannulated with a modified Seldinger technique. Subsequently a 6 Surinamese sheath was placed in the right radial artery. Selective coronary catheterization was performed using a 6 Fr catheter(s) exchanged over a guide wire to cannulate the coronary arteries. A David 3.5 tip catheter was used for left coronary injections. A David 3.5 tip catheter was used for right coronary injections.  Multiple injections of contrast were made into the left and right coronary arteries with angiograms recorded in multiple projections. After completion of the procedure, the arterial sheath was pulled and a TR Band Radial Compression Device was utilized to obtain patent hemostasis.    Coronary Angiography:  The coronary  circulation is right dominant.    Left Main Coronary Artery:  The left main coronary artery is a normal caliber vessel. The left main arises normally from the left coronary sinus of Valsalva, bifurcates into the LAD and circumflex coronary arteries and gives rise to the ramus intermedius artery. The left main coronary artery showed mild distal atherosclerotic disease.    Left Anterior Descending Coronary Artery Distribution:  The left anterior descending coronary artery is a normal caliber vessel. The LAD arises normally from the left main coronary artery. The LAD demonstrated chronic occlusion originating at the ostium and There are well-developed collaterals from the RCA to the LAD.    Circumflex Coronary Artery Distribution:  The circumflex coronary artery is a normal caliber vessel. The circumflex arises normally from the left main coronary artery, giving rise to the first obtuse marginal, second obtuse marginal and third obtuse marginal. The circumflex revealed mild mid-segment luminal irregularities. The distal circumflex coronary artery showed 60% stenosis. This lesion was eccentric and focal. The 1st obtuse marginal branch is a normal caliber vessel. The proximal to mid 1st obtuse marginal branch showed 80% stenosis. This lesion was focal.    Ramus Intermedius:  The ramus intermedius is a large caliber vessel. The ramus intermedius arises normally from the left main coronary artery. The ramus intermedius showed mild proximal to mid atherosclerotic disease. The mid ramus intermedius showed 50% stenosis.    Right Coronary Artery Distribution:    The right coronary artery is a large caliber vessel. The RCA supplies the posterolateral system and supplies the right posterolateral descending artery. The RCA showed mild proximal to mid atherosclerotic disease. The right posterior descending artery is a large caliber vessel. The right posterior descending artery showed mild atherosclerotic disease.    Coronary  Lesion Summary:  Vessel       Stenosis   Vessel Segment  Circumflex 60% stenosis     distal  OM 1       80% stenosis proximal to mid  Ramus      50% stenosis       mid        Complications:  No in-lab complications observed.    Cardiac Cath Transition of Care Summary:  Post Procedure           Double vessel disease.  Diagnosis:  Blood Loss:              Estimated blood loss during the procedure was minimal  mls.  Specimens Removed:       Number of specimen(s) removed: none.      Recommendations:  Maximize medical therapy.  CT surgical consultation to consider possible surgical options.  Telemetry monitoring.  Monitor vitals and arterial access site/pulses.  Lipid lowering agent or Statin therapy.  Optimize heart failure medical therapy.  Elective referral to CT surgery for consideration for CABG.  Aspirin therapy.  Beta blocker therapy.  Angiotensin-converting enzyme (ACE) inhibitor for LV systolic dysfunction.    ____________________________________________________________________________________  CONCLUSIONS:  1. Severe multivessel CAD as described.  2. Ostial % , with well-developed right to left collaterals from RCA.  3. LCx distal ~ 60% focal stenosis, OM1 prox-mid focal 80% stenosis.  4. Large RI with mild prox to mid disease, mid ~ 50% stenosis.  5. Large RCA with mild prox-mid dse.  6. No significant LV-AO peak to peak pullback gradient.  7. Left Ventricular end-diastolic pressure = 20.    ____________________________________________________________________________________  CPT Codes:  Left Heart Cath (visualization of coronaries) and LV-64423; Moderate Sedation Services initial 15 minutes patient >5 years-38258; Moderate Sedation Services 1st additional 15 minutes patient >5 years-00116    ICD 10 Codes:  I50.21-Acute systolic (congestive) heart failure    [id number] Salvatore Love MD  Performing Physician  Electronically signed by [id number] Salvatore Love MD on 1/18/2023 at 5:22:43 PM      cc Report to:  GOVIND WATTS           Final   No results found for this or any previous visit from the past 3650 days.     Cardiac Scoring: No results found for this or any previous visit from the past 1825 days.    AAA : No results found for this or any previous visit from the past 1825 days.    OTHER: No results found for this or any previous visit from the past 1825 days.    LAST IMAGING RESULTS  ECG 12 lead  Sinus tachycardia  Probable left atrial enlargement  LAD, consider left anterior fascicular block  Anteroseptal infarct, age indeterminate    See ED provider note for full interpretation and clinical correlation  Confirmed by Millie Sanchez ([id number]) on 3/25/2024 11:06:56 AM      Problem List Items Addressed This Visit       Chronic combined systolic and diastolic heart failure - Primary    Coronary artery disease involving native coronary artery of native heart    Essential hypertension, benign    Dyslipidemia    History of tobacco use    COPD (chronic obstructive pulmonary disease) (Multi)          Govind Watts DO, FACC, FACOI

## 2024-11-13 ENCOUNTER — APPOINTMENT (OUTPATIENT)
Dept: CARDIOLOGY | Facility: HOSPITAL | Age: 54
End: 2024-11-13
Payer: COMMERCIAL

## 2024-11-13 VITALS
HEART RATE: 93 BPM | SYSTOLIC BLOOD PRESSURE: 122 MMHG | HEIGHT: 72 IN | BODY MASS INDEX: 27.55 KG/M2 | WEIGHT: 203.4 LBS | DIASTOLIC BLOOD PRESSURE: 72 MMHG

## 2024-11-13 DIAGNOSIS — E78.5 HYPERLIPIDEMIA, UNSPECIFIED HYPERLIPIDEMIA TYPE: ICD-10-CM

## 2024-11-13 DIAGNOSIS — I25.10 CORONARY ARTERY DISEASE INVOLVING NATIVE CORONARY ARTERY OF NATIVE HEART WITHOUT ANGINA PECTORIS: ICD-10-CM

## 2024-11-13 DIAGNOSIS — I50.42 CHRONIC COMBINED SYSTOLIC AND DIASTOLIC HEART FAILURE: Primary | ICD-10-CM

## 2024-11-13 DIAGNOSIS — I10 ESSENTIAL HYPERTENSION, BENIGN: ICD-10-CM

## 2024-11-13 DIAGNOSIS — J44.9 CHRONIC OBSTRUCTIVE PULMONARY DISEASE, UNSPECIFIED COPD TYPE (MULTI): ICD-10-CM

## 2024-11-13 DIAGNOSIS — E78.5 DYSLIPIDEMIA: ICD-10-CM

## 2024-11-13 DIAGNOSIS — Z87.891 HISTORY OF TOBACCO USE: ICD-10-CM

## 2024-11-13 PROCEDURE — 99214 OFFICE O/P EST MOD 30 MIN: CPT | Performed by: INTERNAL MEDICINE

## 2024-11-13 PROCEDURE — 3008F BODY MASS INDEX DOCD: CPT | Performed by: INTERNAL MEDICINE

## 2024-11-13 PROCEDURE — 93010 ELECTROCARDIOGRAM REPORT: CPT | Performed by: INTERNAL MEDICINE

## 2024-11-13 PROCEDURE — 3078F DIAST BP <80 MM HG: CPT | Performed by: INTERNAL MEDICINE

## 2024-11-13 PROCEDURE — 93005 ELECTROCARDIOGRAM TRACING: CPT | Performed by: INTERNAL MEDICINE

## 2024-11-13 PROCEDURE — 3074F SYST BP LT 130 MM HG: CPT | Performed by: INTERNAL MEDICINE

## 2024-11-13 PROCEDURE — 4004F PT TOBACCO SCREEN RCVD TLK: CPT | Performed by: INTERNAL MEDICINE

## 2024-11-13 RX ORDER — ATORVASTATIN CALCIUM 80 MG/1
80 TABLET, FILM COATED ORAL DAILY
Qty: 90 TABLET | Refills: 3 | Status: SHIPPED | OUTPATIENT
Start: 2024-11-13

## 2024-12-05 ASSESSMENT — ENCOUNTER SYMPTOMS
RHINORRHEA: 0
FATIGUE: 0
FEVER: 0
COUGH: 1
WHEEZING: 1
UNEXPECTED WEIGHT CHANGE: 0
SHORTNESS OF BREATH: 1
CHILLS: 0

## 2024-12-06 ENCOUNTER — OFFICE VISIT (OUTPATIENT)
Dept: PULMONOLOGY | Facility: HOSPITAL | Age: 54
End: 2024-12-06
Payer: COMMERCIAL

## 2024-12-06 VITALS
TEMPERATURE: 98.2 F | HEIGHT: 72 IN | HEART RATE: 95 BPM | BODY MASS INDEX: 29.93 KG/M2 | RESPIRATION RATE: 16 BRPM | WEIGHT: 221 LBS | SYSTOLIC BLOOD PRESSURE: 114 MMHG | OXYGEN SATURATION: 94 % | DIASTOLIC BLOOD PRESSURE: 72 MMHG

## 2024-12-06 DIAGNOSIS — F17.210 CIGARETTE SMOKER: ICD-10-CM

## 2024-12-06 DIAGNOSIS — J45.909 ASTHMA, UNSPECIFIED ASTHMA SEVERITY, UNSPECIFIED WHETHER COMPLICATED, UNSPECIFIED WHETHER PERSISTENT (HHS-HCC): ICD-10-CM

## 2024-12-06 DIAGNOSIS — J44.9 CHRONIC OBSTRUCTIVE PULMONARY DISEASE, UNSPECIFIED COPD TYPE (MULTI): Primary | ICD-10-CM

## 2024-12-06 DIAGNOSIS — J30.9 ALLERGIC RHINITIS, UNSPECIFIED SEASONALITY, UNSPECIFIED TRIGGER: ICD-10-CM

## 2024-12-06 PROCEDURE — 99213 OFFICE O/P EST LOW 20 MIN: CPT | Performed by: NURSE PRACTITIONER

## 2024-12-06 PROCEDURE — 3008F BODY MASS INDEX DOCD: CPT | Performed by: NURSE PRACTITIONER

## 2024-12-06 PROCEDURE — 4004F PT TOBACCO SCREEN RCVD TLK: CPT | Performed by: NURSE PRACTITIONER

## 2024-12-06 PROCEDURE — 3078F DIAST BP <80 MM HG: CPT | Performed by: NURSE PRACTITIONER

## 2024-12-06 PROCEDURE — 3074F SYST BP LT 130 MM HG: CPT | Performed by: NURSE PRACTITIONER

## 2024-12-06 RX ORDER — ALBUTEROL SULFATE 90 UG/1
2 INHALANT RESPIRATORY (INHALATION) EVERY 4 HOURS PRN
Qty: 18 G | Refills: 11 | Status: SHIPPED | OUTPATIENT
Start: 2024-12-06

## 2024-12-06 RX ORDER — BUDESONIDE, GLYCOPYRROLATE, AND FORMOTEROL FUMARATE 160; 9; 4.8 UG/1; UG/1; UG/1
2 AEROSOL, METERED RESPIRATORY (INHALATION)
Qty: 10.7 G | Refills: 11 | Status: SHIPPED | OUTPATIENT
Start: 2024-12-06

## 2024-12-06 NOTE — PATIENT INSTRUCTIONS
Continue Breztri 2 puffs twice a day, everyday.   Continue albuterol as needed.   Please get CT scan of your chest done soon.  Call with any questions or concerns.  Follow up with me in 6 months.

## 2025-01-02 ENCOUNTER — HOSPITAL ENCOUNTER (OUTPATIENT)
Dept: CARDIOLOGY | Facility: HOSPITAL | Age: 55
Discharge: HOME | End: 2025-01-02
Payer: COMMERCIAL

## 2025-01-02 ENCOUNTER — TELEPHONE (OUTPATIENT)
Dept: CARDIOLOGY | Facility: HOSPITAL | Age: 55
End: 2025-01-02

## 2025-01-02 DIAGNOSIS — I50.42 CHRONIC COMBINED SYSTOLIC AND DIASTOLIC HEART FAILURE: ICD-10-CM

## 2025-01-02 DIAGNOSIS — I25.10 CORONARY ARTERY DISEASE INVOLVING NATIVE CORONARY ARTERY OF NATIVE HEART WITHOUT ANGINA PECTORIS: ICD-10-CM

## 2025-01-02 DIAGNOSIS — I10 ESSENTIAL HYPERTENSION, BENIGN: ICD-10-CM

## 2025-01-02 DIAGNOSIS — E78.5 HYPERLIPIDEMIA, UNSPECIFIED HYPERLIPIDEMIA TYPE: ICD-10-CM

## 2025-01-02 DIAGNOSIS — Z87.891 HISTORY OF TOBACCO USE: ICD-10-CM

## 2025-01-02 DIAGNOSIS — E78.5 DYSLIPIDEMIA: ICD-10-CM

## 2025-01-02 DIAGNOSIS — J44.9 CHRONIC OBSTRUCTIVE PULMONARY DISEASE, UNSPECIFIED COPD TYPE (MULTI): ICD-10-CM

## 2025-01-02 LAB
AORTIC VALVE MEAN GRADIENT: 2 MMHG
AORTIC VALVE PEAK VELOCITY: 0.98 M/S
AV PEAK GRADIENT: 4 MMHG
AVA (PEAK VEL): 3.37 CM2
AVA (VTI): 3.27 CM2
EJECTION FRACTION APICAL 4 CHAMBER: 68.7
EJECTION FRACTION: 55 %
GLOBAL LONGITUDINAL STRAIN: 13 %
LEFT ATRIUM VOLUME AREA LENGTH INDEX BSA: 16.9 ML/M2
LEFT VENTRICLE INTERNAL DIMENSION DIASTOLE: 5.52 CM (ref 3.5–6)
LEFT VENTRICULAR OUTFLOW TRACT DIAMETER: 2.34 CM
LV EJECTION FRACTION BIPLANE: 63 %
MITRAL VALVE E/A RATIO: 0.76
MITRAL VALVE E/E' RATIO: 4.86
RIGHT VENTRICLE FREE WALL PEAK S': 11.72 CM/S
RIGHT VENTRICLE PEAK SYSTOLIC PRESSURE: 19.6 MMHG
TRICUSPID ANNULAR PLANE SYSTOLIC EXCURSION: 1.5 CM

## 2025-01-02 PROCEDURE — 93306 TTE W/DOPPLER COMPLETE: CPT

## 2025-01-02 PROCEDURE — 93356 MYOCRD STRAIN IMG SPCKL TRCK: CPT | Performed by: INTERNAL MEDICINE

## 2025-01-02 PROCEDURE — 93306 TTE W/DOPPLER COMPLETE: CPT | Performed by: INTERNAL MEDICINE

## 2025-01-02 NOTE — TELEPHONE ENCOUNTER
RN called pt at this time with results and plan. Pt verbalized understanding.      ----- Message from Govind Watts sent at 1/2/2025  4:30 PM EST -----  Echocardiogram shows normal left ventricular systolic function with an ejection fraction 55%, low normal right ventricular systolic function, no significant valve abnormalities.  LV function is improved compared to prior.

## 2025-01-13 DIAGNOSIS — I50.42 CHRONIC COMBINED SYSTOLIC AND DIASTOLIC CONGESTIVE HEART FAILURE: ICD-10-CM

## 2025-01-20 RX ORDER — SPIRONOLACTONE 25 MG/1
25 TABLET ORAL DAILY
Qty: 90 TABLET | Refills: 0 | Status: SHIPPED | OUTPATIENT
Start: 2025-01-20

## 2025-01-27 DIAGNOSIS — J44.9 CHRONIC OBSTRUCTIVE PULMONARY DISEASE, UNSPECIFIED COPD TYPE (MULTI): ICD-10-CM

## 2025-01-27 RX ORDER — BUDESONIDE, GLYCOPYRROLATE, AND FORMOTEROL FUMARATE 160; 9; 4.8 UG/1; UG/1; UG/1
2 AEROSOL, METERED RESPIRATORY (INHALATION)
Qty: 32.1 G | Refills: 3 | Status: SHIPPED | OUTPATIENT
Start: 2025-01-27

## 2025-01-31 ENCOUNTER — TELEPHONE (OUTPATIENT)
Dept: CARDIOLOGY | Facility: HOSPITAL | Age: 55
End: 2025-01-31
Payer: COMMERCIAL

## 2025-01-31 DIAGNOSIS — E78.5 DYSLIPIDEMIA: Primary | ICD-10-CM

## 2025-01-31 LAB
ALBUMIN SERPL-MCNC: 4.8 G/DL (ref 3.6–5.1)
ALP SERPL-CCNC: 88 U/L (ref 35–144)
ALT SERPL-CCNC: 37 U/L (ref 9–46)
ANION GAP SERPL CALCULATED.4IONS-SCNC: 7 MMOL/L (CALC) (ref 7–17)
ANION GAP SERPL CALCULATED.4IONS-SCNC: 7 MMOL/L (CALC) (ref 7–17)
AST SERPL-CCNC: 40 U/L (ref 10–35)
BILIRUB SERPL-MCNC: 0.7 MG/DL (ref 0.2–1.2)
BNP SERPL-MCNC: NORMAL PG/ML
BUN SERPL-MCNC: 17 MG/DL (ref 7–25)
BUN SERPL-MCNC: 17 MG/DL (ref 7–25)
BUN/CREAT SERPL: ABNORMAL (CALC) (ref 6–22)
CALCIUM SERPL-MCNC: 9.3 MG/DL (ref 8.6–10.3)
CALCIUM SERPL-MCNC: 9.3 MG/DL (ref 8.6–10.3)
CHLORIDE SERPL-SCNC: 101 MMOL/L (ref 98–110)
CHLORIDE SERPL-SCNC: 101 MMOL/L (ref 98–110)
CHOLEST SERPL-MCNC: 139 MG/DL
CHOLEST/HDLC SERPL: 3.5 (CALC)
CO2 SERPL-SCNC: 31 MMOL/L (ref 20–32)
CO2 SERPL-SCNC: 31 MMOL/L (ref 20–32)
CREAT SERPL-MCNC: 1.02 MG/DL (ref 0.7–1.3)
CREAT SERPL-MCNC: 1.02 MG/DL (ref 0.7–1.3)
EGFRCR SERPLBLD CKD-EPI 2021: 88 ML/MIN/1.73M2
EGFRCR SERPLBLD CKD-EPI 2021: 88 ML/MIN/1.73M2
ERYTHROCYTE [DISTWIDTH] IN BLOOD BY AUTOMATED COUNT: 13 % (ref 11–15)
GLUCOSE SERPL-MCNC: 104 MG/DL (ref 65–99)
GLUCOSE SERPL-MCNC: 104 MG/DL (ref 65–99)
HCT VFR BLD AUTO: 48.4 % (ref 38.5–50)
HDLC SERPL-MCNC: 40 MG/DL
HGB BLD-MCNC: 16.4 G/DL (ref 13.2–17.1)
LDLC SERPL CALC-MCNC: 81 MG/DL (CALC)
MAGNESIUM SERPL-MCNC: 2.5 MG/DL (ref 1.5–2.5)
MCH RBC QN AUTO: 31.4 PG (ref 27–33)
MCHC RBC AUTO-ENTMCNC: 33.9 G/DL (ref 32–36)
MCV RBC AUTO: 92.5 FL (ref 80–100)
NONHDLC SERPL-MCNC: 99 MG/DL (CALC)
PLATELET # BLD AUTO: 211 THOUSAND/UL (ref 140–400)
PMV BLD REES-ECKER: 9 FL (ref 7.5–12.5)
POTASSIUM SERPL-SCNC: 4.1 MMOL/L (ref 3.5–5.3)
POTASSIUM SERPL-SCNC: 4.1 MMOL/L (ref 3.5–5.3)
PROT SERPL-MCNC: 7.4 G/DL (ref 6.1–8.1)
RBC # BLD AUTO: 5.23 MILLION/UL (ref 4.2–5.8)
SODIUM SERPL-SCNC: 139 MMOL/L (ref 135–146)
SODIUM SERPL-SCNC: 139 MMOL/L (ref 135–146)
TRIGL SERPL-MCNC: 97 MG/DL
WBC # BLD AUTO: 7.8 THOUSAND/UL (ref 3.8–10.8)

## 2025-01-31 RX ORDER — EVOLOCUMAB 140 MG/ML
140 INJECTION, SOLUTION SUBCUTANEOUS
Qty: 2 EACH | Refills: 6 | Status: SHIPPED | OUTPATIENT
Start: 2025-01-31

## 2025-01-31 NOTE — TELEPHONE ENCOUNTER
1/31/25  1018  Patient returned call. Gave results to patient and plan for Repatha. Patient verbalized understanding of results and is agreeable to plan.        New Rx for Repatha sent for approval.    1/31/25  1006  Called patient; no answer. Left voice message for patient to return call for results and recommendations from  Dr. Watts.      ----- Message from Govind Watts sent at 1/31/2025  8:31 AM EST -----  Please call the patient regarding his abnormal result.  Lipid panel shows suboptimal control of his cholesterol with an LDL cholesterol of 81 and triglycerides of 97.  Please make sure patient is taking atorvastatin and Zetia as prescribed.  If he reports compliance please start the patient on Repatha 140 mg subcu every 2 weeks.

## 2025-02-03 LAB
ALBUMIN SERPL-MCNC: 4.8 G/DL (ref 3.6–5.1)
ALP SERPL-CCNC: 88 U/L (ref 35–144)
ALT SERPL-CCNC: 37 U/L (ref 9–46)
ANION GAP SERPL CALCULATED.4IONS-SCNC: 7 MMOL/L (CALC) (ref 7–17)
ANION GAP SERPL CALCULATED.4IONS-SCNC: 7 MMOL/L (CALC) (ref 7–17)
AST SERPL-CCNC: 40 U/L (ref 10–35)
BILIRUB SERPL-MCNC: 0.7 MG/DL (ref 0.2–1.2)
BNP SERPL-MCNC: 6 PG/ML
BUN SERPL-MCNC: 17 MG/DL (ref 7–25)
BUN SERPL-MCNC: 17 MG/DL (ref 7–25)
BUN/CREAT SERPL: ABNORMAL (CALC) (ref 6–22)
CALCIUM SERPL-MCNC: 9.3 MG/DL (ref 8.6–10.3)
CALCIUM SERPL-MCNC: 9.3 MG/DL (ref 8.6–10.3)
CHLORIDE SERPL-SCNC: 101 MMOL/L (ref 98–110)
CHLORIDE SERPL-SCNC: 101 MMOL/L (ref 98–110)
CHOLEST SERPL-MCNC: 139 MG/DL
CHOLEST/HDLC SERPL: 3.5 (CALC)
CO2 SERPL-SCNC: 31 MMOL/L (ref 20–32)
CO2 SERPL-SCNC: 31 MMOL/L (ref 20–32)
CREAT SERPL-MCNC: 1.02 MG/DL (ref 0.7–1.3)
CREAT SERPL-MCNC: 1.02 MG/DL (ref 0.7–1.3)
EGFRCR SERPLBLD CKD-EPI 2021: 88 ML/MIN/1.73M2
EGFRCR SERPLBLD CKD-EPI 2021: 88 ML/MIN/1.73M2
ERYTHROCYTE [DISTWIDTH] IN BLOOD BY AUTOMATED COUNT: 13 % (ref 11–15)
GLUCOSE SERPL-MCNC: 104 MG/DL (ref 65–99)
GLUCOSE SERPL-MCNC: 104 MG/DL (ref 65–99)
HCT VFR BLD AUTO: 48.4 % (ref 38.5–50)
HDLC SERPL-MCNC: 40 MG/DL
HGB BLD-MCNC: 16.4 G/DL (ref 13.2–17.1)
LDLC SERPL CALC-MCNC: 81 MG/DL (CALC)
MAGNESIUM SERPL-MCNC: 2.5 MG/DL (ref 1.5–2.5)
MCH RBC QN AUTO: 31.4 PG (ref 27–33)
MCHC RBC AUTO-ENTMCNC: 33.9 G/DL (ref 32–36)
MCV RBC AUTO: 92.5 FL (ref 80–100)
NONHDLC SERPL-MCNC: 99 MG/DL (CALC)
PLATELET # BLD AUTO: 211 THOUSAND/UL (ref 140–400)
PMV BLD REES-ECKER: 9 FL (ref 7.5–12.5)
POTASSIUM SERPL-SCNC: 4.1 MMOL/L (ref 3.5–5.3)
POTASSIUM SERPL-SCNC: 4.1 MMOL/L (ref 3.5–5.3)
PROT SERPL-MCNC: 7.4 G/DL (ref 6.1–8.1)
RBC # BLD AUTO: 5.23 MILLION/UL (ref 4.2–5.8)
SODIUM SERPL-SCNC: 139 MMOL/L (ref 135–146)
SODIUM SERPL-SCNC: 139 MMOL/L (ref 135–146)
TRIGL SERPL-MCNC: 97 MG/DL
WBC # BLD AUTO: 7.8 THOUSAND/UL (ref 3.8–10.8)

## 2025-02-11 DIAGNOSIS — I50.42 CHRONIC COMBINED SYSTOLIC AND DIASTOLIC HEART FAILURE: ICD-10-CM

## 2025-02-13 RX ORDER — LOSARTAN POTASSIUM 25 MG/1
25 TABLET ORAL DAILY
Qty: 90 TABLET | Refills: 1 | Status: SHIPPED | OUTPATIENT
Start: 2025-02-13

## 2025-02-18 ENCOUNTER — HOSPITAL ENCOUNTER (OUTPATIENT)
Dept: RADIOLOGY | Facility: HOSPITAL | Age: 55
Discharge: HOME | End: 2025-02-18
Payer: COMMERCIAL

## 2025-02-18 DIAGNOSIS — R93.89 ABNORMAL CT OF THE CHEST: ICD-10-CM

## 2025-02-18 PROCEDURE — 71250 CT THORAX DX C-: CPT

## 2025-02-18 PROCEDURE — 71250 CT THORAX DX C-: CPT | Performed by: RADIOLOGY

## 2025-02-20 ENCOUNTER — TELEPHONE (OUTPATIENT)
Dept: PULMONOLOGY | Facility: HOSPITAL | Age: 55
End: 2025-02-20
Payer: COMMERCIAL

## 2025-02-20 NOTE — TELEPHONE ENCOUNTER
Patient acknowledged understanding. All questions answered at this time.    ----- Message from Amy Bolanos sent at 2/19/2025  2:20 PM EST -----  Please call the him and let him know that his CT chest shows resolution of the nodule of concern, he will now qualify for LDCT in one year from now.

## 2025-03-05 DIAGNOSIS — I10 ESSENTIAL HYPERTENSION, BENIGN: Primary | ICD-10-CM

## 2025-03-05 RX ORDER — CARVEDILOL 6.25 MG/1
6.25 TABLET ORAL
Qty: 180 TABLET | Refills: 3 | Status: SHIPPED | OUTPATIENT
Start: 2025-03-05 | End: 2026-03-05

## 2025-04-19 NOTE — PROGRESS NOTES
[facility] Card[facility]    Patient Name: Simón Rajput  Patient : 1970    Scribe Attestation  By signing my name below, MONIValerie Scribe attest that this documentation has been prepared under the direction and in the presence of Govind Watts DO.    Scribe Attestation  By signing my name below, Lillie MONTENEGRO Scribe attest that this documentation has been prepared under the direction and in the presence of Govind Watts DO.     Physician Attestation  Govind MONTENEGRO DO, personally performed the services described in the documentation as scribed by Lillie Santiago in my presence, and confirm it is both accurate and complete.    Reason for visit:  This is a 53-year-old male here for follow-up regarding chronic systolic and diastolic heart failure with an ejection fraction 45%, coronary artery disease status post bypass done in 2023, hypertension, dyslipidemia, and COPD/tobacco use.    HPI:  This is a 53-year-old male here for follow-up regarding chronic systolic and diastolic heart failure with an ejection fraction sweetie of 35% now improved to 55%, coronary artery disease status post bypass done in 2023, hypertension, dyslipidemia, and COPD/tobacco use.  The patient was last evaluated by me in 2023.  At that visit I started him on Zetia 10 mg daily, ordered blood work including CMP/lipid/magnesium/CBC/BNP to be drawn in 6 months, refer the patient to food for life, and asked that he follow-up in 6 months and sooner if necessary. CMP done 2025 showed normal serum sodium and potassium with a serum creatinine of 1.02, ALT of 37, AST of 40, serum magnesium was 2.5, BNP was 6, CBC showed hemoglobin of 16.4.  Lipid panel done in 2025 showed an LDL cholesterol of 81 and triglycerides of 97 while on atorvastatin 80 mg daily and Zetia 10 mg daily.  Patient was subsequently started on Repatha 140 mg SQ every 2 weeks.  Echocardiogram done 2025  showed normal left ventricular systolic function with an ejection fraction 55%, low normal right ventricular systolic function, no significant valve abnormalities. ECG done today showed sinus rhythm with heart rate of 84 bpm and possible septal infarct age undetermined.  The patient reports that he has been feeling generally well from the cardiac standpoint. He denies any new chest pain, palpitations, and lightheadedness. He states that he takes all of his medications as prescribed. During my exam, he was resting comfortably on the exam table.            Assessment/Plan:   1. HFrecEF  The patient has a history of chronic systolic and diastolic heart failure with an ejection fraction sweetie of 35% now improved to 55%.  Echocardiogram done 1/2/2025 showed normal left ventricular systolic function with an ejection fraction 55%, low normal right ventricular systolic function, no significant valve abnormalities.  He does not appear significantly volume overloaded on exam today.  He should continue his current cardiac medications.  Recent lab works as noted in the HPI.   Lab works will be done today and again in 6 months prior to the next visit.   I discussed with him the importance of following a low-sodium heart healthy diet.   Follow up in 6 months and sooner if necessary.      2. Coronary artery disease  The patient has a history coronary artery disease status post bypass done in April 2023.  ECG done today showed sinus rhythm with heart rate of 84 bpm and possible septal infarct age undetermined.  He denies anginal chest discomfort.  Blood pressure appears controlled on exam today.  He should continue current antihypertensive medications and antiplatelet therapy.  Echocardiogram done on 4/26/23 mildly decreased left ventricular systolic function with an ejection fraction of 45%. Abnormal septal motion consistent with post-operative status. There is low normal right ventricular systolic function. Poorly visualized  anatomical structures due to suboptimal image quality.  Recent lab works as noted in the HPI.  Lipid panel done in January 2025 showed an LDL cholesterol of 81 and triglycerides of 97 while on atorvastatin 80 mg daily and Zetia 10 mg daily.  She is currently on atorvastatin 80 mg daily, Zetia 10 mg daily and Repatha 140 mg SQ every 2 weeks.    Lab works will be done today and again in 6 months prior to the next visit.   Please see lifestyle recommendations below.  Follow up in 6 months and sooner if necessary.      3. Hypertension  The patient has a history of hypertension which appears controlled on exam today.  He should continue his current antihypertensive medications and monitor his blood pressure at home.     4. Dyslipidemia  Lipid panel done in January 2025 showed an LDL cholesterol of 81 and triglycerides of 97 while on atorvastatin 80 mg daily and Zetia 10 mg daily.  She is currently on atorvastatin 80 mg daily, Zetia 10 mg daily and Repatha 140 mg SQ every 2 weeks.   Lipid panel was ordered as noted below.   Please see lifestyle recommendations below.     5. COPD/History of tobacco use  The patient is a former smoker.  I discussed with him the importance of continued smoking cessation.  He should continue to follow with Pulmonology for management of COPD.        Orders:   CMP/lipid,   BMP/BNP/magnesium in 6 months,   Follow-up in 6 months.      Lifestyle Recommendations  I recommend a whole-food plant-based diet, an eating pattern that encourages the consumption of unrefined plant foods (such as fruits, vegetables, tubers, whole grains, legumes, nuts and seeds) and discourages meats, dairy products, eggs and processed foods.     The AHA/ACC recommends that the patient consume a dietary pattern that emphasizes intake of vegetables, fruits, and whole grains; includes low-fat dairy products, poultry, fish, legumes, non-tropical vegetable oils, and nuts; and limits intake of sodium, sweets, sugar-sweetened  beverages, and red meats.  Adapt this dietary pattern to appropriate calorie requirements (a 500-750 kcal/day deficit to loose weight), personal and cultural food preferences, and nutrition therapy for other medical conditions (including diabetes).  Achieve this pattern by following plans such as the [place] Mediterranean, DASH dietary pattern, or AHA diet.     Engage in 2 hours and 30 minutes per week of moderate-intensity physical activity, or 1 hour and 15 minutes (75 minutes) per week of vigorous-intensity aerobic physical activity, or an equivalent combination of moderate and vigorous-intensity aerobic physical activity. Aerobic activity should be performed in episodes of at least 10 minutes preferably spread throughout the week.     Adhering to a heart healthy diet, regular exercise habits, avoidance of tobacco products, and maintenance of a healthy weight are crucial components of their heart disease risk reduction.     Any positive review of systems not specifically addressed in the office visit today should be evaluated and treated by the patients primary care physician or in an emergency department if necessary     Patient was notified that results from ordered tests will be called to the patient if it changes current management; it will otherwise be discussed at a future appointment and available on  [employer].     Thank you for allowing me to participate in the care of this patient.        This document was generated using the assistance of voice recognition software. If there are any errors of spelling, grammar, syntax, or meaning; please feel free to contact me directly for clarification.    Past Medical History:  He has no past medical history on file.    Past Surgical History:  He has no past surgical history on file.      Social History:  He reports that he has been smoking cigarettes. He does not have any smokeless tobacco history on file. No history on file for alcohol use and drug use.    Family  History:  No family history on file.     Allergies:  Patient has no known allergies.    Outpatient Medications:  Current Outpatient Medications   Medication Instructions    albuterol 90 mcg/actuation inhaler 2 puffs, inhalation, Every 4 hours PRN    aspirin 81 mg, oral, Daily    atorvastatin (LIPITOR) 80 mg, oral, Daily    budesonide-glycopyr-formoterol (Breztri Aerosphere) 160-9-4.8 mcg/actuation HFA aerosol inhaler 2 puffs, inhalation, 2 times daily RT    carvedilol (COREG) 6.25 mg, oral, 2 times daily (morning and late afternoon)    clopidogrel (PLAVIX) 75 mg, Daily    ezetimibe (ZETIA) 10 mg, oral, Daily    losartan (COZAAR) 25 mg, oral, Daily    Repatha SureClick 140 mg, subcutaneous, Every 14 days    spironolactone (ALDACTONE) 25 mg, oral, Daily        ROS:  A 14 point review of systems was done and is negative other than as stated in HPI    Vitals:      3/24/2024     8:57 PM 3/25/2024    12:49 AM 3/25/2024     5:12 AM 3/25/2024     9:00 AM 5/23/2024    11:46 AM 11/13/2024     2:30 PM 12/6/2024     2:37 PM   Vitals   Systolic 116 109 127 100 139 122 114   Diastolic 51 62 75 62 99 72 72   BP Location    Right arm  Left arm    Heart Rate 61 56 71 64 78 93 95   Temp 36.8 °C (98.2 °F) 36.8 °C (98.3 °F) 36.7 °C (98.1 °F) 36.6 °C (97.9 °F)   36.8 °C (98.2 °F)   Resp 16 16 16 14 16  16   Height     1.829 m (6') 1.829 m (6') 1.829 m (6')   Weight (lb)     203.4 203.4 221   BMI     27.59 kg/m2 27.59 kg/m2 29.97 kg/m2   BSA (m2)     2.17 m2 2.17 m2 2.25 m2        Physical Exam:   Constitutional: Cooperative, in no acute distress, alert, appears stated age.   Skin: Skin color, texture, turgor normal. No rashes or lesions.   Head: Normocephalic. No masses, lesions, tenderness or abnormalities   Eyes: Extraocular movements are grossly intact.   Mouth and throat: Mucous membranes moist   Neck: Neck supple, no carotid bruits, no JVD   Respiratory: Lungs clear to auscultation, no wheezing or rhonchi, no use of accessory  muscles   Chest wall: Sternal scar, normal excursion with respiration   Cardiovascular: Regular rhythm, no murmur  Gastrointestinal: Abdomen soft, nontender. Bowel sounds normal.   Musculoskeletal: Strength equal in upper extremities   Extremities: No pitting edema   Neurologic: Sensation grossly intact, alert and oriented x3      Intake/Output:   No intake/output data recorded.    Outpatient Medications  Current Outpatient Medications on File Prior to Visit   Medication Sig Dispense Refill    albuterol 90 mcg/actuation inhaler Inhale 2 puffs every 4 hours if needed for wheezing. 18 g 11    aspirin 81 mg EC tablet Take 1 tablet by mouth once daily 90 tablet 3    atorvastatin (Lipitor) 80 mg tablet Take 1 tablet (80 mg) by mouth once daily. 90 tablet 3    budesonide-glycopyr-formoterol (Breztri Aerosphere) 160-9-4.8 mcg/actuation HFA aerosol inhaler Inhale 2 puffs 2 times a day. 32.1 g 3    carvedilol (Coreg) 6.25 mg tablet Take 1 tablet (6.25 mg) by mouth 2 times daily (morning and late afternoon). 180 tablet 3    clopidogrel (Plavix) 75 mg tablet Take 1 tablet (75 mg) by mouth once daily.      evolocumab (Repatha SureClick) 140 mg/mL injection Inject 1 mL (140 mg) under the skin every 14 (fourteen) days. 2 each 6    ezetimibe (Zetia) 10 mg tablet Take 1 tablet (10 mg) by mouth once daily. 90 tablet 3    losartan (Cozaar) 25 mg tablet Take 1 tablet by mouth once daily 90 tablet 1    spironolactone (Aldactone) 25 mg tablet Take 1 tablet by mouth once daily 90 tablet 0     No current facility-administered medications on file prior to visit.       Labs: (past 26 weeks)  Recent Results (from the past 26 weeks)   Transthoracic Echo (TTE) Complete    Collection Time: 01/02/25 10:28 AM   Result Value Ref Range    LVOT diam 2.34 cm    LV Biplane EF 63 %    MV E/A ratio 0.76     MV avg E/e' ratio 4.86     Tricuspid annular plane systolic excursion 1.5 cm    AV mn grad 2 mmHg    LA vol index A/L 16.9 ml/m2    AV pk nemesio 0.98  m/s    LV EF 55 %    RV free wall pk S' 11.72 cm/s    LV GLS 13.0 %    RVSP 19.6 mmHg    LVIDd 5.52 cm    Aortic Valve Area by Continuity of VTI 3.27 cm2    Aortic Valve Area by Continuity of Peak Velocity 3.37 cm2    AV pk grad 4 mmHg    LV A4C EF 68.7    Lipid Panel    Collection Time: 01/30/25  8:37 AM   Result Value Ref Range    CHOLESTEROL, TOTAL 139 <200 mg/dL    HDL CHOLESTEROL 40 > OR = 40 mg/dL    TRIGLYCERIDES 97 <150 mg/dL    LDL-CHOLESTEROL 81 mg/dL (calc)    CHOL/HDLC RATIO 3.5 <5.0 (calc)    NON HDL CHOLESTEROL 99 <130 mg/dL (calc)   Magnesium    Collection Time: 01/30/25  8:37 AM   Result Value Ref Range    MAGNESIUM 2.5 1.5 - 2.5 mg/dL   Basic Metabolic Panel    Collection Time: 01/30/25  8:37 AM   Result Value Ref Range    GLUCOSE 104 (H) 65 - 99 mg/dL    UREA NITROGEN (BUN) 17 7 - 25 mg/dL    CREATININE 1.02 0.70 - 1.30 mg/dL    EGFR 88 > OR = 60 mL/min/1.73m2    BUN/CREATININE RATIO SEE NOTE: 6 - 22 (calc)    SODIUM 139 135 - 146 mmol/L    POTASSIUM 4.1 3.5 - 5.3 mmol/L    CHLORIDE 101 98 - 110 mmol/L    CARBON DIOXIDE 31 20 - 32 mmol/L    ELECTROLYTE BALANCE 7 7 - 17 mmol/L (calc)    CALCIUM 9.3 8.6 - 10.3 mg/dL   Comprehensive Metabolic Panel    Collection Time: 01/30/25  8:37 AM   Result Value Ref Range    GLUCOSE 104 (H) 65 - 99 mg/dL    UREA NITROGEN (BUN) 17 7 - 25 mg/dL    CREATININE 1.02 0.70 - 1.30 mg/dL    EGFR 88 > OR = 60 mL/min/1.73m2    SODIUM 139 135 - 146 mmol/L    POTASSIUM 4.1 3.5 - 5.3 mmol/L    CHLORIDE 101 98 - 110 mmol/L    CARBON DIOXIDE 31 20 - 32 mmol/L    ELECTROLYTE BALANCE 7 7 - 17 mmol/L (calc)    CALCIUM 9.3 8.6 - 10.3 mg/dL    PROTEIN, TOTAL 7.4 6.1 - 8.1 g/dL    ALBUMIN 4.8 3.6 - 5.1 g/dL    BILIRUBIN, TOTAL 0.7 0.2 - 1.2 mg/dL    ALKALINE PHOSPHATASE 88 35 - 144 U/L    AST 40 (H) 10 - 35 U/L    ALT 37 9 - 46 U/L   CBC    Collection Time: 01/30/25  8:37 AM   Result Value Ref Range    WHITE BLOOD CELL COUNT 7.8 3.8 - 10.8 Thousand/uL    RED BLOOD CELL COUNT 5.23 4.20  - 5.80 Million/uL    HEMOGLOBIN 16.4 13.2 - 17.1 g/dL    HEMATOCRIT 48.4 38.5 - 50.0 %    MCV 92.5 80.0 - 100.0 fL    MCH 31.4 27.0 - 33.0 pg    MCHC 33.9 32.0 - 36.0 g/dL    RDW 13.0 11.0 - 15.0 %    PLATELET COUNT 211 140 - 400 Thousand/uL    MPV 9.0 7.5 - 12.5 fL   B-Type Natriuretic Peptide    Collection Time: 01/30/25  8:37 AM   Result Value Ref Range    B TYPE NATRIURETIC PEPTIDE (BNP) 6 <100 pg/mL       ECG  Encounter Date: 11/13/24   ECG 12 Lead    Narrative    Sinus rhythm, possible septal infarct age undetermined, heart rate 93 bpm.       Echocardiogram  No results found for this or any previous visit from the past 1095 days.      CV Studies:  EKG:  Encounter Date: 11/13/24   ECG 12 Lead    Narrative    Sinus rhythm, possible septal infarct age undetermined, heart rate 93 bpm.     Echocardiogram:   Echocardiogram     [facility], [address] [place] [address]  Tel [phone] and Fax [phone]    TRANSTHORACIC ECHOCARDIOGRAM REPORT      Patient Name:     RALPH SHANELL Colon Physician:  [id number] Ventura Huerta MD  Study Date:       4/26/2023          Referring           ALCIRA FUENTES  Physician:  MRN/PID:          [id number]           PCP:  Accession/Order#: [id number]          [facility]  Location:           Invasive  YOB: 1970          Fellow:  Gender:           M                  Nurse:              Felicita Hodgson RN  Admit Date:       4/17/2023          Sonographer:        Lloyd Abbott  Cardiac Sonographer  Intern  Admission Status: Inpatient -        Additional Staff:   Tabatha Mix [facility]  Routine  Height:           182.88 cm          CC Report to:       [address]  Weight:           80.29 kg           Study Type:         Echocardiogram  BSA:              2.02 m2  Blood Pressure: 119 /65 mmHg    Diagnosis/ICD: Z95.1-Presence of aortocoronary bypass graft  Indication:    postop CABG  Procedure/CPT: Echo  Complete w Full [id number]    Patient History:  Pertinent History: Dyspnea. PMH, HTN, HLD, Smoker, COPD, HF (35-40%), s/p CABG  x3 (4/17/23).    Study Detail: The following Echo studies were performed: M-Mode, 2D, Doppler and  color flow. Technically challenging study due to poor acoustic  windows, prominent lung artifact and patient lying in supine  position. Definity used as a contrast agent for endocardial border  definition. Total contrast used for this procedure was 1.0 mL via  IV push.      PHYSICIAN INTERPRETATION:  Left Ventricle: The left ventricular systolic function is mildly decreased, with an estimated ejection fraction of 45%. The left ventricular cavity size is mildly dilated. Abnormal (paradoxical) septal motion consistent with post-operative status. Left ventricular diastolic filling was indeterminate.  Left Atrium: The left atrium is upper limits of normal in size.  Right Ventricle: The right ventricle is normal in size. There is low normal right ventricular systolic function. RV not well visualized.  Right Atrium: The right atrium is upper limits of normal in size.  Aortic Valve: The aortic valve is probably trileaflet. There is trivial aortic valve regurgitation. The peak instantaneous gradient of the aortic valve is 4.2 mmHg.  Mitral Valve: The mitral valve is normal in structure. There is mild mitral annular calcification. There is trace mitral valve regurgitation.  Tricuspid Valve: The tricuspid valve is structurally normal. There is trace tricuspid regurgitation.  Pulmonic Valve: The pulmonic valve is not well visualized. There is trace pulmonic valve regurgitation.  Pericardium: There is a trivial pericardial effusion.  Aorta: The aortic root was not well visualized. There is no dilatation of the aortic root.  Systemic Veins: The inferior vena cava appears to be of normal size. There is IVC inspiratory collapse greater than 50%.      CONCLUSIONS:  1. Poorly visualized anatomical  structures due to suboptimal image quality.  2. Left ventricular systolic function is mildly decreased with a 45% estimated ejection fraction.  3. Abnormal septal motion consistent with post-operative status.  4. There is low normal right ventricular systolic function.    QUANTITATIVE DATA SUMMARY:  2D MEASUREMENTS:  Normal Ranges:  Ao Root d:     3.60 cm    (2.0-3.7cm)  LAs:           3.50 cm    (2.7-4.0cm)  IVSd:          1.00 cm    (0.6-1.1cm)  LVPWd:         1.00 cm    (0.6-1.1cm)  LVIDd:         5.70 cm    (3.9-5.9cm)  LVIDs:         4.20 cm  LV Mass Index: 111.9 g/m2  LV % FS        26.3 %    AORTA MEASUREMENTS:  Normal Ranges:  Ao Sinus, d: 3.50 cm (2.1-3.5cm)    LV DIASTOLIC FUNCTION:  Normal Ranges:  MV Peak E:    0.56 m/s    (0.7-1.2 m/s)  MV Peak A:    0.53 m/s    (0.42-0.7 m/s)  E/A Ratio:    1.06        (1.0-2.2)  MV e'         0.08 m/s    (>8.0)  MV lateral e' 0.08 m/s  MV medial e'  0.09 m/s  MV A Dur:     127.00 msec  E/e' Ratio:   6.62        (<8.0)  MV DT:        222 msec    (150-240 msec)    MITRAL VALVE:  Normal Ranges:  MV DT: 222 msec (150-240msec)    AORTIC VALVE:  Normal Ranges:  AoV Vmax:      1.02 m/s (<=1.7m/s)  AoV Peak P.2 mmHg (<20mmHg)  LVOT Max Elvis:  0.82 m/s (<=1.1m/s)  LVOT VTI:      15.10 cm  LVOT Diameter: 2.10 cm  (1.8-2.4cm)  AoV Area,Vmax: 2.80 cm2 (2.5-4.5cm2)    RIGHT VENTRICLE:  RV s' 0.08 m/s    TRICUSPID VALVE/RVSP:  Normal Ranges:  IVC Diam: 1.40 cm    PULMONIC VALVE:  Normal Ranges:  PV Accel Time: 107 msec (>120ms)  PV Max Elvis:    1.0 m/s  (0.6-0.9m/s)  PV Max P.2 mmHg      [id number] Ventura Huerta MD  Electronically signed on 2023 at 11:09:16 AM         Final     Stress Testing GIANNI([id number]:1:182): No results found for this or any previous visit from the past  days.    Cardiac Catheterization:   Adult Cath     [facility], Cath Lab  [address] [place] [address]  Phone [phone] Fax  [phone]    Cardiovascular Catheterization Report    Patient Name:     RALPH REECE Performing Physician: [id number] Salvatore Love MD  Study Date:       1/12/2023      Verifying Physician:  [id number] Salvatore Love MD  MRN/PID:          [id number]       Cardiologist:  Accession/Order#: [id number]      Referring Physician:  JIMMY FATIMA  YOB: 1970      Referring Physician:  Gender:           M              Referring Physician:      Study: Left Heart Catheterization      Indications:  RALPH REECE is a 53 year old male who presents with dyslipidemia, hypertension, chronic pulmonary disease and tobacco Use - current. Left ventricular dysfunction and suspected coronary artery disease, with an asymptomatic chest pain assessment. Study performed as an urgent cath procedure.    Medical History:  Stress test performed: No. CTA performed: No. Isidra accessed: No. LVEF Assessed: Yes. LVEF = 35-40%%.    Procedure Description:  After infiltration with 2% Lidocaine, the right radial artery was cannulated with a modified Seldinger technique. Subsequently a 6 Bengali sheath was placed in the right radial artery. Selective coronary catheterization was performed using a 6 Fr catheter(s) exchanged over a guide wire to cannulate the coronary arteries. A David 3.5 tip catheter was used for left coronary injections. A David 3.5 tip catheter was used for right coronary injections.  Multiple injections of contrast were made into the left and right coronary arteries with angiograms recorded in multiple projections. After completion of the procedure, the arterial sheath was pulled and a TR Band Radial Compression Device was utilized to obtain patent hemostasis.    Coronary Angiography:  The coronary circulation is right dominant.    Left Main Coronary Artery:  The left main coronary artery is a normal caliber vessel. The left main arises normally from the left coronary sinus of Valsalva, bifurcates into the LAD and circumflex  coronary arteries and gives rise to the ramus intermedius artery. The left main coronary artery showed mild distal atherosclerotic disease.    Left Anterior Descending Coronary Artery Distribution:  The left anterior descending coronary artery is a normal caliber vessel. The LAD arises normally from the left main coronary artery. The LAD demonstrated chronic occlusion originating at the ostium and There are well-developed collaterals from the RCA to the LAD.    Circumflex Coronary Artery Distribution:  The circumflex coronary artery is a normal caliber vessel. The circumflex arises normally from the left main coronary artery, giving rise to the first obtuse marginal, second obtuse marginal and third obtuse marginal. The circumflex revealed mild mid-segment luminal irregularities. The distal circumflex coronary artery showed 60% stenosis. This lesion was eccentric and focal. The 1st obtuse marginal branch is a normal caliber vessel. The proximal to mid 1st obtuse marginal branch showed 80% stenosis. This lesion was focal.    Ramus Intermedius:  The ramus intermedius is a large caliber vessel. The ramus intermedius arises normally from the left main coronary artery. The ramus intermedius showed mild proximal to mid atherosclerotic disease. The mid ramus intermedius showed 50% stenosis.    Right Coronary Artery Distribution:    The right coronary artery is a large caliber vessel. The RCA supplies the posterolateral system and supplies the right posterolateral descending artery. The RCA showed mild proximal to mid atherosclerotic disease. The right posterior descending artery is a large caliber vessel. The right posterior descending artery showed mild atherosclerotic disease.    Coronary Lesion Summary:  Vessel       Stenosis   Vessel Segment  Circumflex 60% stenosis     distal  OM 1       80% stenosis proximal to mid  Ramus      50% stenosis       mid        Complications:  No in-lab complications observed.    Cardiac  Cath Transition of Care Summary:  Post Procedure           Double vessel disease.  Diagnosis:  Blood Loss:              Estimated blood loss during the procedure was minimal  mls.  Specimens Removed:       Number of specimen(s) removed: none.      Recommendations:  Maximize medical therapy.  CT surgical consultation to consider possible surgical options.  Telemetry monitoring.  Monitor vitals and arterial access site/pulses.  Lipid lowering agent or Statin therapy.  Optimize heart failure medical therapy.  Elective referral to CT surgery for consideration for CABG.  Aspirin therapy.  Beta blocker therapy.  Angiotensin-converting enzyme (ACE) inhibitor for LV systolic dysfunction.    ____________________________________________________________________________________  CONCLUSIONS:  1. Severe multivessel CAD as described.  2. Ostial % , with well-developed right to left collaterals from RCA.  3. LCx distal ~ 60% focal stenosis, OM1 prox-mid focal 80% stenosis.  4. Large RI with mild prox to mid disease, mid ~ 50% stenosis.  5. Large RCA with mild prox-mid dse.  6. No significant LV-AO peak to peak pullback gradient.  7. Left Ventricular end-diastolic pressure = 20.    ____________________________________________________________________________________  CPT Codes:  Left Heart Cath (visualization of coronaries) and LV-[id number]; Moderate Sedation Services initial 15 minutes patient >5 years-41322; Moderate Sedation Services 1st additional 15 minutes patient >5 years-[id number]    ICD 10 Codes:  I50.21-Acute systolic (congestive) heart failure    [id number] Salvatore Love MD  Performing Physician  Electronically signed by [id number] Salvatore Love MD on 1/18/2023 at 5:22:43 PM      cc Report to: JIMMY FATIMA           Final   No results found for this or any previous visit from the past 3650 days.     Cardiac Scoring: No results found for this or any previous visit from the past 1825 days.    AAA : No results found for this or any  previous visit from the past 1825 days.    OTHER: No results found for this or any previous visit from the past 1825 days.    LAST IMAGING RESULTS  CT chest wo IV contrast  Narrative: Interpreted By:  Houston Moreno,   STUDY:  CT CHEST WO IV CONTRAST;  2/18/2025 11:37 am      INDICATION:  Signs/Symptoms:abnormal CT chest follow up.      COMPARISON:  None      ACCESSION NUMBER(S):  [id number]      ORDERING CLINICIAN:  NAKITA BRANDT      TECHNIQUE:  Helical data acquisition of the chest was obtained without  intravenous contrast. Images were reformatted in axial, coronal, and  sagittal planes.      FINDINGS:  No pneumothorax. No pleural effusion. Bronchial thickening is seen.  Mucus seen in the small airways. Improved opacity seen in the lungs  on background emphysema. Improvement in the lung bases also noted  suggesting some improved inflammation      Median sternotomy. No growing hilar or mediastinal lymph nodes      Fatty infiltration of the liver. Unremarkable adrenal glands.      No growing hilar or mediastinal lymph nodes. Unremarkable ascending  aorta.      Impression: 1. Improvement of aeration in the lungs. No new nodules detected.  2. There is diffuse bronchial thickening as well as some mucus seen  in the small airways          Signed by: Houston Moreno 2/19/2025 1:50 PM  Dictation workstation:   [id number]      Problem List Items Addressed This Visit       Chronic combined systolic and diastolic heart failure - Primary    Coronary artery disease involving native coronary artery of native heart    Essential hypertension, benign    Dyslipidemia    History of tobacco use    COPD (chronic obstructive pulmonary disease) (Multi)          Govind Watts DO, FACC, FACOI

## 2025-04-21 DIAGNOSIS — I50.42 CHRONIC COMBINED SYSTOLIC AND DIASTOLIC CONGESTIVE HEART FAILURE: ICD-10-CM

## 2025-04-22 RX ORDER — SPIRONOLACTONE 25 MG/1
25 TABLET ORAL DAILY
Qty: 90 TABLET | Refills: 0 | Status: SHIPPED | OUTPATIENT
Start: 2025-04-22

## 2025-05-05 DIAGNOSIS — I25.10 CORONARY ARTERY DISEASE INVOLVING NATIVE CORONARY ARTERY OF NATIVE HEART, UNSPECIFIED WHETHER ANGINA PRESENT: ICD-10-CM

## 2025-05-05 DIAGNOSIS — E78.5 HYPERLIPIDEMIA, UNSPECIFIED HYPERLIPIDEMIA TYPE: ICD-10-CM

## 2025-05-09 ENCOUNTER — APPOINTMENT (OUTPATIENT)
Dept: CARDIOLOGY | Facility: HOSPITAL | Age: 55
End: 2025-05-09
Payer: COMMERCIAL

## 2025-05-09 VITALS
WEIGHT: 221 LBS | HEIGHT: 72 IN | SYSTOLIC BLOOD PRESSURE: 124 MMHG | DIASTOLIC BLOOD PRESSURE: 86 MMHG | HEART RATE: 84 BPM | BODY MASS INDEX: 29.93 KG/M2

## 2025-05-09 DIAGNOSIS — I25.10 CORONARY ARTERY DISEASE INVOLVING NATIVE CORONARY ARTERY OF NATIVE HEART WITHOUT ANGINA PECTORIS: ICD-10-CM

## 2025-05-09 DIAGNOSIS — E78.5 HYPERLIPIDEMIA, UNSPECIFIED HYPERLIPIDEMIA TYPE: ICD-10-CM

## 2025-05-09 DIAGNOSIS — J44.9 CHRONIC OBSTRUCTIVE PULMONARY DISEASE, UNSPECIFIED COPD TYPE (MULTI): ICD-10-CM

## 2025-05-09 DIAGNOSIS — I50.42 CHRONIC COMBINED SYSTOLIC AND DIASTOLIC HEART FAILURE: Primary | ICD-10-CM

## 2025-05-09 DIAGNOSIS — E78.5 DYSLIPIDEMIA: ICD-10-CM

## 2025-05-09 DIAGNOSIS — I10 ESSENTIAL HYPERTENSION, BENIGN: ICD-10-CM

## 2025-05-09 DIAGNOSIS — Z87.891 HISTORY OF TOBACCO USE: ICD-10-CM

## 2025-05-09 LAB
ATRIAL RATE: 84 BPM
P AXIS: 81 DEGREES
P OFFSET: 193 MS
P ONSET: 141 MS
PR INTERVAL: 166 MS
Q ONSET: 224 MS
QRS COUNT: 13 BEATS
QRS DURATION: 96 MS
QT INTERVAL: 366 MS
QTC CALCULATION(BAZETT): 432 MS
QTC FREDERICIA: 409 MS
R AXIS: 40 DEGREES
T AXIS: 83 DEGREES
T OFFSET: 407 MS
VENTRICULAR RATE: 84 BPM

## 2025-05-09 PROCEDURE — 3008F BODY MASS INDEX DOCD: CPT | Performed by: INTERNAL MEDICINE

## 2025-05-09 PROCEDURE — 3074F SYST BP LT 130 MM HG: CPT | Performed by: INTERNAL MEDICINE

## 2025-05-09 PROCEDURE — 3079F DIAST BP 80-89 MM HG: CPT | Performed by: INTERNAL MEDICINE

## 2025-05-09 PROCEDURE — 99214 OFFICE O/P EST MOD 30 MIN: CPT | Performed by: INTERNAL MEDICINE

## 2025-05-09 PROCEDURE — 93005 ELECTROCARDIOGRAM TRACING: CPT | Performed by: INTERNAL MEDICINE

## 2025-05-09 PROCEDURE — 93010 ELECTROCARDIOGRAM REPORT: CPT | Performed by: INTERNAL MEDICINE

## 2025-05-09 PROCEDURE — 99214 OFFICE O/P EST MOD 30 MIN: CPT | Mod: 25 | Performed by: INTERNAL MEDICINE

## 2025-05-10 LAB
ALBUMIN SERPL-MCNC: 4.6 G/DL (ref 3.6–5.1)
ALP SERPL-CCNC: 93 U/L (ref 35–144)
ALT SERPL-CCNC: 21 U/L (ref 9–46)
ANION GAP SERPL CALCULATED.4IONS-SCNC: 8 MMOL/L (CALC) (ref 7–17)
AST SERPL-CCNC: 17 U/L (ref 10–35)
BILIRUB SERPL-MCNC: 0.7 MG/DL (ref 0.2–1.2)
BUN SERPL-MCNC: 15 MG/DL (ref 7–25)
CALCIUM SERPL-MCNC: 9.6 MG/DL (ref 8.6–10.3)
CHLORIDE SERPL-SCNC: 101 MMOL/L (ref 98–110)
CHOLEST SERPL-MCNC: 89 MG/DL
CHOLEST/HDLC SERPL: 2.2 (CALC)
CO2 SERPL-SCNC: 30 MMOL/L (ref 20–32)
CREAT SERPL-MCNC: 1.15 MG/DL (ref 0.7–1.3)
EGFRCR SERPLBLD CKD-EPI 2021: 77 ML/MIN/1.73M2
GLUCOSE SERPL-MCNC: 93 MG/DL (ref 65–139)
HDLC SERPL-MCNC: 40 MG/DL
LDLC SERPL CALC-MCNC: 29 MG/DL (CALC)
NONHDLC SERPL-MCNC: 49 MG/DL (CALC)
POTASSIUM SERPL-SCNC: 4.3 MMOL/L (ref 3.5–5.3)
PROT SERPL-MCNC: 7 G/DL (ref 6.1–8.1)
SODIUM SERPL-SCNC: 139 MMOL/L (ref 135–146)
TRIGL SERPL-MCNC: 122 MG/DL

## 2025-05-10 RX ORDER — EZETIMIBE 10 MG/1
10 TABLET ORAL DAILY
Qty: 90 TABLET | Refills: 3 | Status: SHIPPED | OUTPATIENT
Start: 2025-05-10 | End: 2026-05-10

## 2025-05-13 ENCOUNTER — APPOINTMENT (OUTPATIENT)
Dept: CARDIOLOGY | Facility: CLINIC | Age: 55
End: 2025-05-13
Payer: COMMERCIAL

## 2025-06-06 ENCOUNTER — OFFICE VISIT (OUTPATIENT)
Dept: PULMONOLOGY | Facility: HOSPITAL | Age: 55
End: 2025-06-06
Payer: COMMERCIAL

## 2025-06-06 VITALS
HEART RATE: 92 BPM | OXYGEN SATURATION: 92 % | WEIGHT: 225 LBS | TEMPERATURE: 97.8 F | RESPIRATION RATE: 16 BRPM | SYSTOLIC BLOOD PRESSURE: 114 MMHG | BODY MASS INDEX: 31.5 KG/M2 | DIASTOLIC BLOOD PRESSURE: 81 MMHG | HEIGHT: 71 IN

## 2025-06-06 DIAGNOSIS — J44.9 CHRONIC OBSTRUCTIVE PULMONARY DISEASE, UNSPECIFIED COPD TYPE (MULTI): Primary | ICD-10-CM

## 2025-06-06 DIAGNOSIS — F17.210 CIGARETTE SMOKER: ICD-10-CM

## 2025-06-06 DIAGNOSIS — J30.9 ALLERGIC RHINITIS, UNSPECIFIED SEASONALITY, UNSPECIFIED TRIGGER: ICD-10-CM

## 2025-06-06 DIAGNOSIS — J45.909 ASTHMA, UNSPECIFIED ASTHMA SEVERITY, UNSPECIFIED WHETHER COMPLICATED, UNSPECIFIED WHETHER PERSISTENT (HHS-HCC): ICD-10-CM

## 2025-06-06 PROCEDURE — 99214 OFFICE O/P EST MOD 30 MIN: CPT | Performed by: NURSE PRACTITIONER

## 2025-06-06 PROCEDURE — 99212 OFFICE O/P EST SF 10 MIN: CPT

## 2025-06-06 PROCEDURE — 3074F SYST BP LT 130 MM HG: CPT | Performed by: NURSE PRACTITIONER

## 2025-06-06 PROCEDURE — 3008F BODY MASS INDEX DOCD: CPT | Performed by: NURSE PRACTITIONER

## 2025-06-06 PROCEDURE — 3079F DIAST BP 80-89 MM HG: CPT | Performed by: NURSE PRACTITIONER

## 2025-06-06 RX ORDER — BUDESONIDE, GLYCOPYRROLATE, AND FORMOTEROL FUMARATE 160; 9; 4.8 UG/1; UG/1; UG/1
2 AEROSOL, METERED RESPIRATORY (INHALATION)
Qty: 32.1 G | Refills: 3 | Status: SHIPPED | OUTPATIENT
Start: 2025-06-06

## 2025-06-06 RX ORDER — ALBUTEROL SULFATE 90 UG/1
2 INHALANT RESPIRATORY (INHALATION) EVERY 4 HOURS PRN
Qty: 18 G | Refills: 11 | Status: SHIPPED | OUTPATIENT
Start: 2025-06-06

## 2025-06-06 ASSESSMENT — ENCOUNTER SYMPTOMS
RHINORRHEA: 0
SHORTNESS OF BREATH: 1
HALLUCINATIONS: 1
UNEXPECTED WEIGHT CHANGE: 0
FEVER: 0
CHILLS: 0
FATIGUE: 0
COUGH: 1
WHEEZING: 1

## 2025-06-06 NOTE — PATIENT INSTRUCTIONS
Continue Breztri 2 puffs twice a day, everyday.   Continue albuterol as needed.   Please get CT scan of your chest done in February.   Call with any questions or concerns.  Follow up with me in one year.

## 2025-06-06 NOTE — PROGRESS NOTES
Subjective   Patient ID: Simón Rajput is a 54 y.o. male who presents for COPD/asthma follow up.     HPI: Patient has PMH of CAD s/p CABG, HTN, HLD, COPD, nicotine dependence, and chronic systolic heart failure with EF 45%. He was referred for COPD management. He states that he gets shortness of breath on exertion, productive cough, and will hear wheezing. He does get seasonal allergies also. He denies any known history of asthma. He has been on Trelegy in the past and felt that this did not help as much as Spiriva respimat did. He is not currently on a maintenance inhaler. He is a current smoker at 1/2 ppd he mostly smoked 1-2.5 ppd x 30 years.     Today he is here for follow up. His COPD is currently stable on Breztri. He states he uses his albuterol every few days prn. He has cut back to less than 1 ppd currently. He has no concerns for today.     Review of Systems   Constitutional:  Negative for chills, fatigue, fever and unexpected weight change.   HENT:  Positive for congestion. Negative for postnasal drip and rhinorrhea.    Respiratory:  Positive for cough (denies hemoptysis.), shortness of breath and wheezing.    Cardiovascular:  Negative for chest pain and leg swelling.   Psychiatric/Behavioral:  Positive for hallucinations.    All other systems reviewed and are negative.      Objective   Physical Exam  Vitals reviewed.   Constitutional:       Appearance: Normal appearance.   HENT:      Head: Normocephalic.   Cardiovascular:      Rate and Rhythm: Normal rate and regular rhythm.   Pulmonary:      Effort: Pulmonary effort is normal.      Breath sounds: Wheezing present.   Skin:     General: Skin is warm and dry.   Neurological:      Mental Status: He is alert.       Assessment/Plan   COPD/asthma overlap  Cigarette smoker   Allergic rhinitis     Plan:    -PFTs done 2/2023 with FEV1/FVC 42, FEV1 31-41, , moderately reduced DLCO.   -Continue Breztri 2 puffs BID.   -Continue albuterol prn.   -He is a  current smoker at less than 1 ppd but mostly smoked 1-2.5 ppd x 30 years. Encouraged complete smoking cessation.   -LDCT was not done but he did have a CT chest done during hospitalization in March which showed atypical tree in bud opacities in the RUL and RLL, also a wedge shaped opacity measuring 1.3 cm and recommends a follow up CT chest. I ordered this for a 3 month follow up to be done in mid/end of June 2024. He ended up getting this done 2/18/25 and showed resolution of the nodule of concern. He will now qualify for LDCT February 2026, order placed today.  -IGE, RAST ordered. Eos elevated at 810.    Overall his CT chest showed resolution of opacities, will get LDCT February 2026 and continue current regimen. I will see him on a yearly basis. I instructed patient to call sooner if needed.

## 2025-06-10 DIAGNOSIS — I25.10 CORONARY ARTERY DISEASE INVOLVING NATIVE CORONARY ARTERY OF NATIVE HEART, UNSPECIFIED WHETHER ANGINA PRESENT: ICD-10-CM

## 2025-06-10 DIAGNOSIS — E78.5 HYPERLIPIDEMIA, UNSPECIFIED HYPERLIPIDEMIA TYPE: ICD-10-CM

## 2025-06-10 RX ORDER — EZETIMIBE 10 MG/1
10 TABLET ORAL DAILY
Qty: 90 TABLET | Refills: 3 | Status: SHIPPED | OUTPATIENT
Start: 2025-06-10 | End: 2026-06-10

## 2025-07-20 DIAGNOSIS — I50.42 CHRONIC COMBINED SYSTOLIC AND DIASTOLIC CONGESTIVE HEART FAILURE: ICD-10-CM

## 2025-07-23 RX ORDER — SPIRONOLACTONE 25 MG/1
25 TABLET ORAL DAILY
Qty: 90 TABLET | Refills: 3 | Status: SHIPPED | OUTPATIENT
Start: 2025-07-23

## 2025-07-30 DIAGNOSIS — J44.9 CHRONIC OBSTRUCTIVE PULMONARY DISEASE, UNSPECIFIED COPD TYPE (MULTI): ICD-10-CM

## 2025-07-30 RX ORDER — ALBUTEROL SULFATE 90 UG/1
2 INHALANT RESPIRATORY (INHALATION) EVERY 4 HOURS PRN
Qty: 54 G | Refills: 3 | Status: SHIPPED | OUTPATIENT
Start: 2025-07-30

## 2025-07-30 RX ORDER — BUDESONIDE, GLYCOPYRROLATE, AND FORMOTEROL FUMARATE 160; 9; 4.8 UG/1; UG/1; UG/1
2 AEROSOL, METERED RESPIRATORY (INHALATION)
Qty: 32.1 G | Refills: 3 | Status: SHIPPED | OUTPATIENT
Start: 2025-07-30

## 2025-07-31 DIAGNOSIS — I50.42 CHRONIC COMBINED SYSTOLIC AND DIASTOLIC HEART FAILURE: ICD-10-CM

## 2025-07-31 RX ORDER — LOSARTAN POTASSIUM 25 MG/1
25 TABLET ORAL DAILY
Qty: 90 TABLET | Refills: 1 | Status: SHIPPED | OUTPATIENT
Start: 2025-07-31

## 2025-08-10 DIAGNOSIS — E78.5 DYSLIPIDEMIA: ICD-10-CM

## 2025-08-12 RX ORDER — EVOLOCUMAB 140 MG/ML
INJECTION, SOLUTION SUBCUTANEOUS
Qty: 2 ML | Refills: 0 | Status: SHIPPED | OUTPATIENT
Start: 2025-08-12